# Patient Record
Sex: FEMALE | Race: WHITE | Employment: FULL TIME | URBAN - METROPOLITAN AREA
[De-identification: names, ages, dates, MRNs, and addresses within clinical notes are randomized per-mention and may not be internally consistent; named-entity substitution may affect disease eponyms.]

---

## 2017-12-02 ENCOUNTER — HOSPITAL ENCOUNTER (OUTPATIENT)
Dept: MAMMOGRAPHY | Age: 42
Discharge: HOME | End: 2017-12-02
Attending: INTERNAL MEDICINE
Payer: COMMERCIAL

## 2017-12-02 DIAGNOSIS — Z12.31 VISIT FOR SCREENING MAMMOGRAM: ICD-10-CM

## 2017-12-02 PROCEDURE — 77067 SCR MAMMO BI INCL CAD: CPT

## 2018-03-05 PROBLEM — R10.2 PELVIC PAIN IN FEMALE: Status: ACTIVE | Noted: 2018-03-05

## 2018-12-12 ENCOUNTER — HOSPITAL ENCOUNTER (OUTPATIENT)
Dept: MAMMOGRAPHY | Age: 43
Discharge: HOME | End: 2018-12-12
Attending: INTERNAL MEDICINE
Payer: COMMERCIAL

## 2018-12-12 DIAGNOSIS — Z12.31 VISIT FOR SCREENING MAMMOGRAM: ICD-10-CM

## 2018-12-12 PROCEDURE — 77067 SCR MAMMO BI INCL CAD: CPT

## 2020-03-04 ENCOUNTER — HOSPITAL ENCOUNTER (OUTPATIENT)
Dept: MAMMOGRAPHY | Age: 45
Discharge: HOME | End: 2020-03-04
Attending: OBSTETRICS & GYNECOLOGY
Payer: COMMERCIAL

## 2020-03-04 DIAGNOSIS — Z12.31 VISIT FOR SCREENING MAMMOGRAM: ICD-10-CM

## 2020-03-04 PROCEDURE — 77063 BREAST TOMOSYNTHESIS BI: CPT

## 2021-02-20 PROBLEM — E55.9 VITAMIN D DEFICIENCY: Status: ACTIVE | Noted: 2021-02-20

## 2021-05-04 ENCOUNTER — TRANSCRIBE ORDER (OUTPATIENT)
Dept: SCHEDULING | Age: 46
End: 2021-05-04

## 2021-05-04 DIAGNOSIS — Z12.31 SCREENING MAMMOGRAM FOR HIGH-RISK PATIENT: Primary | ICD-10-CM

## 2021-05-19 ENCOUNTER — HOSPITAL ENCOUNTER (OUTPATIENT)
Dept: MAMMOGRAPHY | Age: 46
Discharge: HOME | End: 2021-05-19
Attending: OBSTETRICS & GYNECOLOGY
Payer: COMMERCIAL

## 2021-05-19 DIAGNOSIS — Z12.31 SCREENING MAMMOGRAM FOR HIGH-RISK PATIENT: ICD-10-CM

## 2021-05-19 PROCEDURE — 77063 BREAST TOMOSYNTHESIS BI: CPT

## 2021-05-24 ENCOUNTER — HOSPITAL ENCOUNTER (OUTPATIENT)
Dept: MAMMOGRAPHY | Age: 46
Discharge: HOME | End: 2021-05-24
Attending: OBSTETRICS & GYNECOLOGY
Payer: COMMERCIAL

## 2021-05-24 DIAGNOSIS — R92.8 ABNORMAL SCREENING MAMMOGRAM: ICD-10-CM

## 2021-05-24 PROCEDURE — 77061 BREAST TOMOSYNTHESIS UNI: CPT

## 2021-05-25 NOTE — PROGRESS NOTES
Pt advised of benign results. Also advised to continue annual mammogram screenings. Pt voiced understanding.

## 2021-09-14 ENCOUNTER — HOSPITAL ENCOUNTER (OUTPATIENT)
Dept: PHYSICAL THERAPY | Age: 46
Discharge: HOME | End: 2021-09-14
Attending: NURSE PRACTITIONER
Payer: COMMERCIAL

## 2021-09-14 DIAGNOSIS — M25.512 CHRONIC PAIN OF BOTH SHOULDERS: ICD-10-CM

## 2021-09-14 DIAGNOSIS — G89.29 CHRONIC PAIN OF BOTH SHOULDERS: ICD-10-CM

## 2021-09-14 DIAGNOSIS — M19.91 PRIMARY OSTEOARTHRITIS, UNSPECIFIED SITE: ICD-10-CM

## 2021-09-14 DIAGNOSIS — M25.511 CHRONIC PAIN OF BOTH SHOULDERS: ICD-10-CM

## 2021-09-14 DIAGNOSIS — M62.838 MUSCLE SPASM: ICD-10-CM

## 2021-09-14 PROCEDURE — 97110 THERAPEUTIC EXERCISES: CPT

## 2021-09-14 PROCEDURE — 97140 MANUAL THERAPY 1/> REGIONS: CPT

## 2021-09-14 PROCEDURE — 97161 PT EVAL LOW COMPLEX 20 MIN: CPT

## 2021-09-14 NOTE — THERAPY EVALUATION
Jo Vasquez  : 1975  Primary: Paul Flores [facility]*  Secondary:  [address] at [place] [address] [place], [address] [place] [address], [place], [address] [place] [address]  [phone]   [phone]       OUTPATIENT PHYSICAL THERAPY:Initial Assessment 2021    ICD-10: Treatment Diagnosis: muscle spasms (G33.723)                Treatment Diagnosis 2: bilateral shoulder pain (M25.511, M25.512)                Treatment Diagnosis 3: cervicalgia (M54.2)                Treatment Diagnosis 4: primary osteoarthritis (M19.91)  Precautions: Type II Diabetes, history of migraines  Allergies: Patient has no known allergies. TREATMENT PLAN:  Effective Dates: 2021 TO 2021 (60 days). Frequency/Duration: 1 time a week for 60 Day(s) MEDICAL/REFERRING DIAGNOSIS:  Primary osteoarthritis, unspecified site [M19.91]  Muscle spasm [M62.838]  Chronic pain of both shoulders [M25.511, G89.29, M25.512]   DATE OF ONSET: Patient reports ongoing pain/ issues for about a year. REFERRING PHYSICIAN: Randee Owens NP MD Orders: Evaluate and Treat   Return MD Appointment: 2021     INITIAL ASSESSMENT:  Ms. Marbin Elmore is a 40 y.o. female presenting to physical therapy with complaints of muscle spasms/ tightness in her shoulders/ neck, R side worse than L, that causes headaches and impacts her daily life. Patient reports pain starting about a year ago and lingering. She reports tightness across bilateral shoulders/ upper trapezius that runs to the base of her skull. 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 She does have frequent headaches from muscle tension and reports stress making her pain worse. Patient is eager to improve her pain and reduce her headaches in order to return to her normal activities.  Patient presents with increased pain, decreased strength, decreased ROM, decreased flexibility, impaired posture, decreased activity tolerance, and overall impaired functional mobility. Patient is a good candidate for skilled physical therapy interventions to include manual therapy, therapeutic exercise, postural re-education, body mechanics training, and pain modalities as needed. PROBLEM LIST (Impacting functional limitations):  1. Decreased Strength  2. Decreased ADL/Functional Activities  3. Increased Pain  4. Decreased Activity Tolerance  5. Decreased Pacing Skills  6. Decreased Work Simplification/Energy Conservation Techniques  7. Decreased Flexibility/Joint Mobility INTERVENTIONS PLANNED: (Treatment may consist of any combination of the following)  1. Cold  2. Electrical Stimulation  3. Family Education  4. Heat  5. Home Exercise Program (HEP)  6. Manual Therapy  7. Neuromuscular Re-education/Strengthening  8. Range of Motion (ROM)  9. Therapeutic Activites  10. Therapeutic Exercise/Strengthening  11. Transfer Training  12. Ultrasound (US)  13. Therapeutic Dry Needling     GOALS: (Goals have been discussed and agreed upon with patient.)  Short-Term Functional Goals: Time Frame: 9/14/2021 to 10/13/2021  1. Patient demonstrates independence with home exercise program without verbal cueing provided by therapist.   2. Patient will be educated in and demonstrate improved upright posture including decreased forward head and rounded shoulders to decrease strain on the cervical spine. 3. Patient will be educated in and try the use of towel rolls, tennis balls, and desk adjustments to improve sleeping, muscle tension, and work ergonomics. 4. Patient will be able to sleep through the night without waking due to neck pain or headache in order to improve sleeping pattern for health and wellness. Discharge Goals: Time Frame: 9/14/2021 to 11/13/2021  1. Patient will demonstrate decreased trigger points and tightness through bilateral upper trapezius in order to improve ROM and decrease muscle tension.   2. Patient will report minimal to no headaches with work activities in order to demonstrate decreased strain and cervicogenic headaches. 3. Patient will be able to work a full day at her desk with minimal to no complaints of neck/ shoulder pain in order to demonstrate improved activity tolerance and ability to perform job duties. 4. Patient will report no more than 1 headache a week in order to demonstrate decreased frequency of tension headaches. 5. Patient will improve Disability of the Arm, Shoulder, and Hand score to 15/55 from 19/55. Outcome Measure: Tool Used: Disabilities of the Arm, Shoulder and Hand (DASH) Questionnaire - Quick Version  Score:  Initial: 19/55  Most Recent: X/55 (Date: -- )   Interpretation of Score: The DASH is designed to measure the activities of daily living in person's with upper extremity dysfunction or pain. Each section is scored on a 1-5 scale, 5 representing the greatest disability. The scores of each section are added together for a total score of 55. Medical Necessity:   · Patient is expected to demonstrate progress in strength, range of motion and posture to increase independence with work duties and decrease strain on the cervical spine. · Skilled intervention continues to be required due to muscle tightness and headaches impacting daily life and work duties. Reason for Services/Other Comments:  · Patient continues to require skilled intervention due to increased pain, muscle tightness, and headaches impacting job duties and quality of life. Total Evaluation Duration: 20 minutes    Rehabilitation Potential For Stated Goals: Good  Regarding [place] therapy, I certify that the treatment plan above will be carried out by a therapist or under their direction.   Thank you for this referral,  Maura Quinonez PT     Referring Physician Signature: Cy More NP _______________________________ Date _____________             PAIN/SUBJECTIVE:    Initial: Pain Intensity 1: 8 (at worst)  Pain Location 1: Neck, Shoulder  Pain Orientation 1: Right, Left (R worse than L)  Post Session:  0/10    HISTORY:    History of Injury/Illness (Reason for Referral):  Ms. Tran Lutz is a 40 y.o. female presenting to physical therapy with complaints of muscle spasms/ tightness in her shoulders/ neck, R side worse than L, that causes headaches and impacts her daily life. Patient reports pain starting about a year ago and lingering. She reports tightness across bilateral shoulders/ upper trapezius that runs to the base of her skull. 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 She does have frequent headaches from muscle tension and reports stress making her pain worse. Patient is eager to improve her pain and reduce her headaches in order to return to her normal activities. Patient presents with increased pain, decreased strength, decreased ROM, decreased flexibility, impaired posture, decreased activity tolerance, and overall impaired functional mobility. Past Medical History/Comorbidities:   Ms. Tran Lutz  has a past medical history of Cardiac radiofrequency ablation (2009), Depression, Dyslipidemia, Essential hypertension, GERD, H/O paroxysmal supraventricular tachycardia (2009), Migraine headache, and Type 2 diabetes mellitus ([facility] [address] Ms. Tran Lutz  has a past surgical history that includes pr cardiac surg procedure unlist (2009). Social History/Living Environment:     Patient lives in private residence with her  and two children.   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 Federal Law prohibits the replication, distribution or use without written permission from [facility] [employer]    Current Medications:        Current Outpatient Medications:     metFORMIN (GLUCOPHAGE) 500 mg tablet, Take 1 Tablet by mouth two (2) times daily (with meals). , Disp: 180 Tablet, Rfl: 0    Blood-Glucose Meter monitoring kit, Use to monitor blood sugar daily. Dx: E11. Insurance preference, Disp: 1 Kit, Rfl: 0    lancets misc, Use to monitor blood sugar daily. Dx: E11. Insurance preference, Disp: 100 Each, Rfl: 3    glucose blood VI test strips (ASCENSIA AUTODISC VI, ONE TOUCH ULTRA TEST VI) strip, Use to monitor blood sugar daily. Dx: E11. Insurance preference, Disp: 100 Strip, Rfl: 3    meloxicam (MOBIC) 15 mg tablet, Take 1 Tablet by mouth daily as needed for Pain., Disp: 90 Tablet, Rfl: 1    cyclobenzaprine (FLEXERIL) 10 mg tablet, Take 1 Tablet by mouth nightly as needed for Muscle Spasm(s). , Disp: 90 Tablet, Rfl: 1    hyoscyamine SL (LEVSIN/SL) 0.125 mg SL tablet, Take 1 Tablet by mouth every six (6) hours as needed for Cramping or Diarrhea., Disp: 90 Tablet, Rfl: 1    pantoprazole (PROTONIX) 40 mg tablet, Take 1 Tablet by mouth daily. , Disp: 90 Tablet, Rfl: 1    citalopram (CELEXA) 40 mg tablet, Take 1 Tablet by mouth daily. , Disp: 90 Tablet, Rfl: 1    atorvastatin (LIPITOR) 20 mg tablet, Take 1 Tablet by mouth nightly., Disp: 90 Tablet, Rfl: 1    metoprolol succinate (TOPROL-XL) 25 mg XL tablet, Take 2 Tablets by mouth daily. , Disp: 180 Tablet, Rfl: 1    L-Norgest&E Estradiol-E Estrad 0.10 mg-20 mcg (84)/10 mcg (7) 3MPk, TAKE 1 TABLET BY MOUTH EVERY DAY, Disp: 1 Package, Rfl: 10    ergocalciferol (Drisdol) 1,250 mcg (50,000 unit) capsule, Take 1 Cap by mouth every seven (7) days. , Disp: 12 Cap, Rfl: 1    Date Last Reviewed:  9/14/2021    Number of Personal Factors/Comorbidities that affect the Plan of Care:  Patient with minimal co-morbidities, but chronic pain 1-2: MODERATE COMPLEXITY    EXAMINATION:    Patient denies any changes in vision, dizziness, vertigo, nausea, drop attacks, black outs, tinnitus, dysphagia, dysarthria, LE symptoms or bowel/bladder dysfunction. Observation/Orthostatic Postural Assessment:          Patient with forward head, rounded shoulders, scapular protraction, increased thoracic kyphosis. Increased muscle tension bilateral upper trapezius. Palpation:          Moderate tenderness to palpation of bilateral upper trapezius and levator scapula with increased tension and trigger points noted. Minimal to no tenderness to palpation of gross bilateral glenohumeral joints and AC joints. Moderate tenderness to palpation of gross cervical spine with significant tightness bilateral suboccipitals, R worse than L.  Vertebral-Basilar Screen: Positional Provocation testing is negative. ROM:          Gross bilateral UE ROM WFL with no pain with active or passive motion. AROM/ PROM Degrees   Cervical Flexion 45   Cervical Extension 40   Right Rotation 47   Left Rotation 54   Right Lateral Flexion 40   Left Lateral Flexion 30     Strength:          Gross bilateral UE strength 5/5 with no pain reported with resisted testing. Gross deep cervical weakness noted with posturing and transfers    Special Tests: All glenohumeral joint testing negative           Cervical compression/ distraction negative for change in symptoms, but cervical traction \"feels good\"  Passive Accessory Motion:         Moderate tightness with cervical side glides C4 through C7        Moderate limitation with posterior to anterior central mobilizations of lower cervical and thoracic spine  Neurological Screen:              Myotomes: Key muscle strength testing through bilateral UE is [facility]. Dermatomes: Sensation to light touch for bilateral UE is intact from C4 to T2.    Reflexes: Biceps (C5): 3+ bilaterally Brachioradialis (C6): 3+ bilaterally        Triceps (C7): 3+ bilaterally  Abnormal reflexes: Mc: not tested  Functional Mobility:         Patient with increased muscle tension with working at her desk at work, reports multiple monthly headaches, waking a couple times a week due to neck pain- L side sleeper. Balance:          Sitting and standing balance grossly intact. Body Structures Involved:  1. Bones  2. Joints  3. Muscles  4. Ligaments Body Functions Affected:  1. Sensory/Pain  2. Neuromusculoskeletal  3. Movement Related Activities and Participation Affected:  1. Mobility  2. Self Care  3. Domestic Life  4.  Community, Social and [place]    Number of elements (examined above) that affect the Plan of Care: 1-2: LOW COMPLEXITY    CLINICAL PRESENTATION:    Presentation:   Stable and uncomplicated: LOW COMPLEXITY    CLINICAL DECISION MAKING:    Use of outcome tool(s) and clinical judgement create a POC that gives a: Clear prediction of patient's progress: LOW COMPLEXITY

## 2021-09-14 NOTE — PROGRESS NOTES
Stefan Show  : 1975  Primary: Paul Flores [facility] Emp Si*  Secondary:  [address] at [facility]  [address], [address] [place], [place], [address]  [phone]   [phone]      OUTPATIENT PHYSICAL THERAPY: Daily Treatment Note 2021    ICD-10: Treatment Diagnosis: muscle spasms (V19.835)                Treatment Diagnosis 2: bilateral shoulder pain (M25.511, M25.512)                Treatment Diagnosis 3: cervicalgia (M54.2)                Treatment Diagnosis 4: primary osteoarthritis (M19.91)  Precautions: Type II Diabetes, history of migraines  Allergies: Patient has no known allergies. TREATMENT PLAN:  Effective Dates: 2021 TO 2021 (60 days). Frequency/Duration: 1 time a week for 60 Day(s) MEDICAL/REFERRING DIAGNOSIS:  Primary osteoarthritis, unspecified site [M19.91]  Muscle spasm [M62.838]  Chronic pain of both shoulders [M25.511, G89.29, M25.512]   DATE OF ONSET: Patient reports ongoing pain/ issues for about a year. REFERRING PHYSICIAN: Bailee Torres NP MD Orders: Evaluate and Treat   Return MD Appointment: 2021     Pre-treatment Symptoms/Complaints:  Patient reports increased muscle tension and tightness through bilateral shoulders/ neck, R worse than L, which causes headaches and impacts her work duties. Pain: Initial: Pain Intensity 1: 8 (at worst)  Pain Location 1: Neck, Shoulder  Pain Orientation 1: Right, Left (R worse than L)  Post Session:  0/10   Medications Last Reviewed:  2021  Updated Objective Findings:  See evaluation note from today   TREATMENT:   THERAPEUTIC EXERCISE: (10 minutes):  Exercises per grid below to improve mobility, strength and coordination. Required moderate visual, verbal, manual and tactile cues to promote proper body alignment, promote proper body posture and promote proper body mechanics. Progressed resistance, range, repetitions and complexity of movement as indicated. Date:  9/14/2021 Date:   Date:     Activity/Exercise Parameters Parameters Parameters   Scapular retraction X 10     Backwards shoulder rolls X 10     Chin tucks X 5     Upper trapezius stretch X 2      Levator scapula stretch X 2                   Time spent with patient reviewing proper muscle recruitment and technique with exercises. MANUAL THERAPY: (15 minutes): Joint mobilization, Soft tissue mobilization and Manipulation was utilized and necessary because of the patient's restricted joint motion, painful spasm, loss of articular motion and restricted motion of soft tissue   Gentle suboccipital release to decrease tension/ headache   Gentle soft tissue mobilization to bilateral upper trapezius with trigger point release to decrease tightness   Prone posterior to anterior mobilizations of lower cervical and thoracic spine, grade III, to improve mobility   Seated cervicothoracic manipulation without audible cavitation to improve mobility    MODALITIES: (0 minutes):      none today     HEP: As above; handouts given to patient for all exercises. Treatment/Session Summary:    · Response to Treatment:  Patient with good performance of exercises and expressed understanding of HEP. Anticipate good progress with manual therapy and postural education/ exercises. May trial dry needling as needed. .  · Communication/Consultation:  Spoke at length with patient about plan of care, possible dry needling, manual techniques, use of towel roll and tennis balls for self positioning and pain relief. · Equipment provided today:  Patient given handout with above exercise for home  · Recommendations/Intent for next treatment session: Next visit will focus on manual therapy and postural exercises as tolerated, modalities as needed. Progress HEP as tolerated.     Total Treatment Billable Duration:  45 minutes: 20 evaluation, 15 manual, 10 therapeutic exercise  PT Patient Time In/Time Out  Time In: 0905  Time Out: [address] Atlas Goldberg Arabella Sprague, PT           '

## 2021-09-23 ENCOUNTER — HOSPITAL ENCOUNTER (OUTPATIENT)
Dept: PHYSICAL THERAPY | Age: 46
Discharge: HOME | End: 2021-09-23
Attending: NURSE PRACTITIONER
Payer: COMMERCIAL

## 2021-09-23 PROCEDURE — 97140 MANUAL THERAPY 1/> REGIONS: CPT

## 2021-09-23 PROCEDURE — 97110 THERAPEUTIC EXERCISES: CPT

## 2021-09-23 NOTE — PROGRESS NOTES
Licha Partida  : 1975  Primary: Paul Flores [facility] Emp Si*  Secondary:  [address] at [facility]  [address] [place], [address] [place] [address], [place], [address] [place] [address]  [phone]   [phone]      OUTPATIENT PHYSICAL THERAPY: Daily Treatment Note 2021    ICD-10: Treatment Diagnosis: muscle spasms (N43.771)                Treatment Diagnosis 2: bilateral shoulder pain (M25.511, M25.512)                Treatment Diagnosis 3: cervicalgia (M54.2)                Treatment Diagnosis 4: primary osteoarthritis (M19.91)  Precautions: Type II Diabetes, history of migraines  Allergies: Patient has no known allergies. TREATMENT PLAN:  Effective Dates: 2021 TO 2021 (60 days). Frequency/Duration: 1 time a week for 60 Day(s) MEDICAL/REFERRING DIAGNOSIS:  Primary osteoarthritis, unspecified site [M19.91]  Other muscle spasm [M62.838]  Pain in right shoulder [M25.511]  Pain in left shoulder [M25.512]   DATE OF ONSET: Patient reports ongoing pain/ issues for about a year. REFERRING PHYSICIAN: Arnoldo Jessica NP MD Orders: Evaluate and Treat   Return MD Appointment: 2021     Pre-treatment Symptoms/Complaints:  Patient reports some relief with home exercise program and that she has overall been feeling some better. She reports however today she is hurting worse, muscle spasm right side of her neck that is going up to her head and causing a headache. Pain: Initial: Pain Intensity 1: 4  Pain Location 1: Neck, Shoulder  Pain Orientation 1: Right  Post Session:  0/10   Medications Last Reviewed:  2021  Updated Objective Findings:  Palpation: Bilateral Upper Trapezius Tenderness, Tightness, Trigger Points. TREATMENT:   THERAPEUTIC EXERCISE: (24 minutes):  Exercises per grid below to improve mobility, strength and coordination.   Required moderate visual, verbal, manual and tactile cues to promote proper body alignment, promote proper body posture and promote proper body mechanics. Progressed resistance, range, repetitions and complexity of movement as indicated. Date:  9/14/2021 Date:  9/23/2021 Date:     Activity/Exercise Parameters Parameters Parameters   Scapular retraction X 10     Backwards shoulder rolls X 10     Chin tucks X 5     Upper trapezius stretch X 2      Levator scapula stretch X 2     UBE  6 minutes, 3 minutes FWD, 3 minutes BWD    Trunk Rotation  Supine x 30     TRX  Flexion/Extension 3 x 10    Abduction 3 x 10            Time spent with patient reviewing proper muscle recruitment and technique with exercises. MANUAL THERAPY: (30 minutes): Joint mobilization, Soft tissue mobilization and Manipulation was utilized and necessary because of the patient's restricted joint motion, painful spasm, loss of articular motion and restricted motion of soft tissue   Soft Tissue Mobilization Bilateral Upper Trapezius, Levator Scapula, Scalenes   Trigger Point Dry Needling Bilateral Upper Trapezius   Dry Needles Used: 7   Dry Needles Removed: 7       MODALITIES: (0 minutes):      none today     HEP: As above; handouts given to patient for all exercises. Treatment/Session Summary:    · Response to Treatment:  Patient was instructed regarding trigger point dry needling, including precautions, contraindications, post care. Patient denied any contraindications to trigger point dry needling. Patient gave verbal and written consent. Patient demonstrated appropriate twitch response, no negative response or soreness. Patient reported significant symptom relief, no pain post treatment. She would benefit from continued progression of mobility, flexibility, ROM to progress functionally. · Communication/Consultation:  Home Exercise Program, Trigger Point Dry Needling Handouts  · Equipment provided today:  None Today  · Recommendations/Intent for next treatment session: Next visit will focus on manual therapy and postural exercises as tolerated, modalities as needed.  Progress HEP as tolerated. Review response to trigger point needling.      Total Treatment Billable Duration:  54 minutes  PT Patient Time In/Time Out  Time In: 1324  Time Out: GIOVANNY Chan       '

## 2021-09-29 ENCOUNTER — HOSPITAL ENCOUNTER (OUTPATIENT)
Dept: PHYSICAL THERAPY | Age: 46
Discharge: HOME | End: 2021-09-29
Attending: NURSE PRACTITIONER
Payer: COMMERCIAL

## 2021-09-29 PROCEDURE — 97110 THERAPEUTIC EXERCISES: CPT

## 2021-09-29 PROCEDURE — 97140 MANUAL THERAPY 1/> REGIONS: CPT

## 2021-09-29 NOTE — PROGRESS NOTES
Nayeli Hooker  : 1975  Primary: Paul Flores [facility]*  Secondary:  [address] at [facility]  [address] [place], [address] [place] [address], [place], [address] [place] [address]  [phone]   [phone]      OUTPATIENT PHYSICAL THERAPY: Daily Treatment Note 2021    ICD-10: Treatment Diagnosis: muscle spasms (P19.307)                Treatment Diagnosis 2: bilateral shoulder pain (M25.511, M25.512)                Treatment Diagnosis 3: cervicalgia (M54.2)                Treatment Diagnosis 4: primary osteoarthritis (M19.91)  Precautions: Type II Diabetes, history of migraines  Allergies: Patient has no known allergies. TREATMENT PLAN:  Effective Dates: 2021 TO 2021 (60 days). Frequency/Duration: 1 time a week for 60 Day(s) MEDICAL/REFERRING DIAGNOSIS:  Primary osteoarthritis, unspecified site [M19.91]  Other muscle spasm [M62.838]  Pain in right shoulder [M25.511]  Pain in left shoulder [M25.512]   DATE OF ONSET: Patient reports ongoing pain/ issues for about a year. REFERRING PHYSICIAN: Le Berg NP MD Orders: Evaluate and Treat   Return MD Appointment: 2021     Pre-treatment Symptoms/Complaints:  Patient reports that she had trace soreness after last session, but has felt really good since. She reports no pain today. Pain: Initial: Pain Intensity 1: 0  Pain Location 1: Neck, Shoulder  Pain Orientation 1: Right, Left  Post Session:  0/10   Medications Last Reviewed:  2021  Updated Objective Findings:  Palpation: Bilateral Upper Trapezius Tenderness, Tightness, Trigger Points. TREATMENT:   THERAPEUTIC EXERCISE: (24 minutes):  Exercises per grid below to improve mobility, strength and coordination. Required moderate visual, verbal, manual and tactile cues to promote proper body alignment, promote proper body posture and promote proper body mechanics. Progressed resistance, range, repetitions and complexity of movement as indicated.      Date:  2021 Date:  9/23/2021 Date:  9/29/2021   Activity/Exercise Parameters Parameters Parameters   Scapular retraction X 10     Backwards shoulder rolls X 10     Chin tucks X 5     Upper trapezius stretch X 2      Levator scapula stretch X 2     UBE  6 minutes, 3 minutes FWD, 3 minutes BWD 8 minutes, 4 minutes FWD, 4 minutes BWD   Trunk Rotation  Supine x 30  Supine x 30    TRX  Flexion/Extension 3 x 10    Abduction 3 x 10 Flexion/Extension 3 x 15    Abduction 3 x 10   Low Rows   3 x 10 Red   Rows   3 x 10 Red     Time spent with patient reviewing proper muscle recruitment and technique with exercises. MANUAL THERAPY: (30 minutes): Joint mobilization, Soft tissue mobilization and Manipulation was utilized and necessary because of the patient's restricted joint motion, painful spasm, loss of articular motion and restricted motion of soft tissue   Soft Tissue Mobilization Bilateral Upper Trapezius, Levator Scapula, Scalenes   Suboccipital Release   Gentle Manual Traction   Trigger Point Dry Needling Bilateral Upper Trapezius   Dry Needles Used: 8   Dry Needles Removed: 8       MODALITIES: (0 minutes):      none today     HEP: As above; handouts given to patient for all exercises. Treatment/Session Summary:    · Response to Treatment:  Patient was instructed regarding trigger point dry needling, including precautions, contraindications, post care. Patient denied any contraindications to trigger point dry needling. Patient gave verbal and written consent. Patient demonstrated appropriate twitch response, no negative response or soreness. Patient reported significant symptom relief, no pain post treatment. She would benefit from graded progression into further postural strengthening.   · Communication/Consultation:  Home Exercise Program, Trigger Point Dry Needling Handouts  · Equipment provided today:  None Today  · Recommendations/Intent for next treatment session: Next visit will focus on manual therapy and postural exercises as tolerated, modalities as needed. Progress HEP as tolerated. Continue to review response to trigger point needling.      Total Treatment Billable Duration:  54 minutes  PT Patient Time In/Time Out  Time In: 0971  Time Out: 101 Ed Oakley, PT       '

## 2021-10-08 ENCOUNTER — HOSPITAL ENCOUNTER (OUTPATIENT)
Dept: PHYSICAL THERAPY | Age: 46
Discharge: HOME | End: 2021-10-08
Attending: NURSE PRACTITIONER
Payer: COMMERCIAL

## 2021-10-08 NOTE — PROGRESS NOTES
Physical Therapy  Ana M Puentes at [facility] 10/8/2021    Patient not able to make it today, confirmed next appointment.       Tommie Merritt PT, DPT, TPS

## 2021-10-11 ENCOUNTER — HOSPITAL ENCOUNTER (OUTPATIENT)
Dept: PHYSICAL THERAPY | Age: 46
Discharge: HOME | End: 2021-10-11
Attending: NURSE PRACTITIONER
Payer: COMMERCIAL

## 2021-10-11 PROCEDURE — 97140 MANUAL THERAPY 1/> REGIONS: CPT

## 2021-10-11 PROCEDURE — 97110 THERAPEUTIC EXERCISES: CPT

## 2021-10-11 NOTE — PROGRESS NOTES
Joel Stewart  : 1975  Primary: Paul Flores [facility]*  Secondary:  [address] at [facility]  [address] [place], [address], [place], [address]  [phone]   [phone]      OUTPATIENT PHYSICAL THERAPY: Daily Treatment Note 10/11/2021    ICD-10: Treatment Diagnosis: muscle spasms (M76.880)                Treatment Diagnosis 2: bilateral shoulder pain (M25.511, M25.512)                Treatment Diagnosis 3: cervicalgia (M54.2)                Treatment Diagnosis 4: primary osteoarthritis (M19.91)  Precautions: Type II Diabetes, history of migraines  Allergies: Patient has no known allergies. TREATMENT PLAN:  Effective Dates: 2021 TO 2021 (60 days). Frequency/Duration: 1 time a week for 60 Day(s) MEDICAL/REFERRING DIAGNOSIS:  Primary osteoarthritis, unspecified site [M19.91]  Other muscle spasm [M62.838]  Pain in right shoulder [M25.511]  Pain in left shoulder [M25.512]   DATE OF ONSET: Patient reports ongoing pain/ issues for about a year. REFERRING PHYSICIAN: Jennie Joe NP MD Orders: Evaluate and Treat   Return MD Appointment: 2021     Pre-treatment Symptoms/Complaints:  Patient reports feeling better since starting therapy and having just a little tightness through her R upper trapezius today. Reports less headaches    Pain: Initial: Pain Intensity 1: 1  Pain Location 1: Neck, Shoulder  Pain Orientation 1: Right  Post Session:  0/10   Medications Last Reviewed:  10/11/2021  Updated Objective Findings:  Palpation: Bilateral Upper Trapezius Tenderness, Tightness, Trigger Points; R worse than L   TREATMENT:   THERAPEUTIC EXERCISE: (24 minutes):  Exercises per grid below to improve mobility, strength and coordination. Required moderate visual, verbal, manual and tactile cues to promote proper body alignment, promote proper body posture and promote proper body mechanics.   Progressed resistance, range, repetitions and complexity of movement as indicated. Date:  10/11/2021 Date:  9/23/2021 Date:  9/29/2021   Activity/Exercise Parameters Parameters Parameters   Scapular retraction ---     Backwards shoulder rolls ---     Chin tucks ---     Upper trapezius stretch ---     Levator scapula stretch ---     UBE Level 2, 3 minutes forward, 3 minutes backwards 6 minutes, 3 minutes FWD, 3 minutes BWD 8 minutes, 4 minutes FWD, 4 minutes BWD   Trunk Rotation Supine x 20 Supine x 30  Supine x 30    Thoracic bows X 10 each side     TRX Flexion/Extension 3 x 10    Abduction 3 x 10 Flexion/Extension 3 x 10    Abduction 3 x 10 Flexion/Extension 3 x 15    Abduction 3 x 10   Low Rows Red, 3 x 10  3 x 10 Red   Rows Red, 3 x 10  3 x 10 Red     Time spent with patient reviewing proper muscle recruitment and technique with exercises. MANUAL THERAPY: (30 minutes): Joint mobilization, Soft tissue mobilization and Manipulation was utilized and necessary because of the patient's restricted joint motion, painful spasm, loss of articular motion and restricted motion of soft tissue   Soft Tissue Mobilization Bilateral Upper Trapezius, Levator Scapula, Scalenes   Suboccipital Release   Gentle Manual Traction   Manual upper trapezius and levator scapula stretching   Trigger Point Dry Needling Bilateral Upper Trapezius - NOT TODAY  Dry Needles Used: 8   Dry Needles Removed: 8       MODALITIES: (0 minutes):      none today     HEP: As above; handouts given to patient for all exercises. Treatment/Session Summary:    · Response to Treatment:  Patient with decreased pain and tightness at end of session. Advised patient to continue with stretching at home. · Communication/Consultation:  Home Exercise Program, Trigger Point Dry Needling Handouts  · Equipment provided today:  None Today  · Recommendations/Intent for next treatment session: Next visit will focus on manual therapy and postural exercises as tolerated, modalities as needed. Progress HEP as tolerated. Continue to review response to trigger point needling.      Total Treatment Billable Duration:  54 minutes  PT Patient Time In/Time Out  Time In: 0905  Time Out: 1000 Aurora Donohue, GIOVANNY       '

## 2021-10-22 ENCOUNTER — HOSPITAL ENCOUNTER (OUTPATIENT)
Dept: PHYSICAL THERAPY | Age: 46
Discharge: HOME | End: 2021-10-22
Attending: NURSE PRACTITIONER
Payer: COMMERCIAL

## 2021-10-22 NOTE — PROGRESS NOTES
[address] at [facility] 10/22/2021      Patient cancelled today's appointment due to having a flat tire. Stated she would call back to re-schedule.        Beto Delarosa, PT, DPT, OMT-C

## 2022-02-11 PROBLEM — R10.2 PELVIC PAIN IN FEMALE: Status: RESOLVED | Noted: 2018-03-05 | Resolved: 2022-02-11

## 2022-03-18 PROBLEM — E55.9 VITAMIN D DEFICIENCY: Status: ACTIVE | Noted: 2021-02-20

## 2022-04-05 NOTE — THERAPY DISCHARGE
Kale Jj  : 1975  Primary: Paul Flores [facility]  Secondary:  [address] at [place] [id number] [facility], [address] [place] [address], [place], [address] [place] [address]  [phone]   [phone]       OUTPATIENT PHYSICAL THERAPY:Discontinuation Summary 2022    ICD-10: Treatment Diagnosis: muscle spasms (Q67.498)                Treatment Diagnosis 2: bilateral shoulder pain (M25.511, M25.512)                Treatment Diagnosis 3: cervicalgia (M54.2)                Treatment Diagnosis 4: primary osteoarthritis (M19.91)  Precautions: Type II Diabetes, history of migraines  Allergies: Patient has no known allergies. TREATMENT PLAN:  Effective Dates: 2021 TO 2021 (60 days). Frequency/Duration: 1 time a week for 60 Day(s) MEDICAL/REFERRING DIAGNOSIS:  Primary osteoarthritis, unspecified site [M19.91]  Other muscle spasm [M62.838]  Pain in right shoulder [M25.511]  Pain in left shoulder [M25.512]   DATE OF ONSET: Patient reports ongoing pain/ issues for about a year. REFERRING PHYSICIAN: Eileen Figueroa NP MD Orders: Evaluate and Treat   Return MD Appointment: 2021     INITIAL ASSESSMENT:  Ms. Clementina Callahan is a 40 y.o. female presenting to physical therapy with complaints of muscle spasms/ tightness in her shoulders/ neck, R side worse than L, that causes headaches and impacts her daily life. Patient reports pain starting about a year ago and lingering. She reports tightness across bilateral shoulders/ upper trapezius that runs to the base of her skull. 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 She does have frequent headaches from muscle tension and reports stress making her pain worse. Patient is eager to improve her pain and reduce her headaches in order to return to her normal activities.  Patient presents with increased pain, decreased strength, decreased ROM, decreased flexibility, impaired posture, decreased activity tolerance, and overall impaired functional mobility. Patient is a good candidate for skilled physical therapy interventions to include manual therapy, therapeutic exercise, postural re-education, body mechanics training, and pain modalities as needed. DISCONTINUATION 4/5/2022: Patient was seen for 4 sessions of physical therapy from 9/14/2021 to 10/11/2021. She reported feeling better with less tightness and headaches at last session. Patient missed a few sessions and then had a flat tire and was to call to re-schedule. She never called back to get on the schedule and is discharged at this time. No goals were met and no updated objective measures taken due to patient not returning to therapy. PROBLEM LIST (Impacting functional limitations):  1. Decreased Strength  2. Decreased ADL/Functional Activities  3. Increased Pain  4. Decreased Activity Tolerance  5. Decreased Pacing Skills  6. Decreased Work Simplification/Energy Conservation Techniques  7. Decreased Flexibility/Joint Mobility INTERVENTIONS PLANNED: (Treatment may consist of any combination of the following)  1. Cold  2. Electrical Stimulation  3. Family Education  4. Heat  5. Home Exercise Program (HEP)  6. Manual Therapy  7. Neuromuscular Re-education/Strengthening  8. Range of Motion (ROM)  9. Therapeutic Activites  10. Therapeutic Exercise/Strengthening  11. Transfer Training  12. Ultrasound (US)  13. Therapeutic Dry Needling     GOALS: (Goals have been discussed and agreed upon with patient.)  Short-Term Functional Goals: Time Frame: 9/14/2021 to 10/13/2021  1. Patient demonstrates independence with home exercise program without verbal cueing provided by therapist. -NOT MET  2. Patient will be educated in and demonstrate improved upright posture including decreased forward head and rounded shoulders to decrease strain on the cervical spine. -NOT MET  3.  Patient will be educated in and try the use of towel rolls, tennis balls, and desk adjustments to improve sleeping, muscle tension, and work ergonomics. -NOT MET  4. Patient will be able to sleep through the night without waking due to neck pain or headache in order to improve sleeping pattern for health and wellness. -NOT MET  Discharge Goals: Time Frame: 9/14/2021 to 11/13/2021  1. Patient will demonstrate decreased trigger points and tightness through bilateral upper trapezius in order to improve ROM and decrease muscle tension. -NOT MET  2. Patient will report minimal to no headaches with work activities in order to demonstrate decreased strain and cervicogenic headaches. -NOT MET  3. Patient will be able to work a full day at her desk with minimal to no complaints of neck/ shoulder pain in order to demonstrate improved activity tolerance and ability to perform job duties. -NOT MET  4. Patient will report no more than 1 headache a week in order to demonstrate decreased frequency of tension headaches. -NOT MET  5. Patient will improve Disability of the Arm, Shoulder, and Hand score to 15/55 from 19/55. -NOT MET    Outcome Measure: Tool Used: Disabilities of the Arm, Shoulder and Hand (DASH) Questionnaire - Quick Version  Score:  Initial: 19/55  Most Recent: X/55 (Date: -- )   Interpretation of Score: The DASH is designed to measure the activities of daily living in person's with upper extremity dysfunction or pain. Each section is scored on a 1-5 scale, 5 representing the greatest disability. The scores of each section are added together for a total score of 55. No updated objective measures due to patient not returning to therapy. Reason for Services/Other Comments:  · Patient discharged . Rehabilitation Potential For Stated Goals: Good  Regarding Jeannette Haylees therapy, I certify that the treatment plan above will be carried out by a therapist or under their direction.   Thank you for this referral,  Graciela Mcleod, PT     Referring Physician Signature: Izabel Bass, CHINTAN No Signature is Required for this note.

## 2022-05-25 PROBLEM — Z91.199 NO-SHOW FOR APPOINTMENT: Status: ACTIVE | Noted: 2022-05-25

## 2022-06-09 ENCOUNTER — OFFICE VISIT (OUTPATIENT)
Dept: INTERNAL MEDICINE CLINIC | Facility: CLINIC | Age: 47
End: 2022-06-09
Payer: COMMERCIAL

## 2022-06-09 VITALS
SYSTOLIC BLOOD PRESSURE: 138 MMHG | HEIGHT: 63 IN | TEMPERATURE: 98.8 F | WEIGHT: 161 LBS | DIASTOLIC BLOOD PRESSURE: 76 MMHG | HEART RATE: 93 BPM | BODY MASS INDEX: 28.53 KG/M2 | OXYGEN SATURATION: 97 %

## 2022-06-09 DIAGNOSIS — K58.9 IRRITABLE BOWEL SYNDROME, UNSPECIFIED TYPE: ICD-10-CM

## 2022-06-09 DIAGNOSIS — E11.9 TYPE 2 DIABETES MELLITUS WITHOUT COMPLICATION, WITHOUT LONG-TERM CURRENT USE OF INSULIN (HCC): Primary | ICD-10-CM

## 2022-06-09 DIAGNOSIS — K21.9 GASTROESOPHAGEAL REFLUX DISEASE, UNSPECIFIED WHETHER ESOPHAGITIS PRESENT: ICD-10-CM

## 2022-06-09 DIAGNOSIS — G43.909 MIGRAINE SYNDROME: ICD-10-CM

## 2022-06-09 DIAGNOSIS — E55.9 VITAMIN D DEFICIENCY: ICD-10-CM

## 2022-06-09 DIAGNOSIS — F32.A ANXIETY AND DEPRESSION: ICD-10-CM

## 2022-06-09 DIAGNOSIS — E78.5 DYSLIPIDEMIA: ICD-10-CM

## 2022-06-09 DIAGNOSIS — F41.9 ANXIETY AND DEPRESSION: ICD-10-CM

## 2022-06-09 DIAGNOSIS — I10 ESSENTIAL HYPERTENSION: ICD-10-CM

## 2022-06-09 PROBLEM — Z91.199 NO-SHOW FOR APPOINTMENT: Status: RESOLVED | Noted: 2022-05-25 | Resolved: 2022-06-09

## 2022-06-09 PROCEDURE — 99214 OFFICE O/P EST MOD 30 MIN: CPT | Performed by: NURSE PRACTITIONER

## 2022-06-09 PROCEDURE — 3051F HG A1C>EQUAL 7.0%<8.0%: CPT | Performed by: NURSE PRACTITIONER

## 2022-06-09 RX ORDER — LISINOPRIL 30 MG/1
30 TABLET ORAL DAILY
Qty: 90 TABLET | Refills: 1 | Status: SHIPPED | OUTPATIENT
Start: 2022-06-09 | End: 2022-08-04 | Stop reason: SINTOL

## 2022-06-09 SDOH — ECONOMIC STABILITY: FOOD INSECURITY: WITHIN THE PAST 12 MONTHS, THE FOOD YOU BOUGHT JUST DIDN'T LAST AND YOU DIDN'T HAVE MONEY TO GET MORE.: NEVER TRUE

## 2022-06-09 SDOH — ECONOMIC STABILITY: FOOD INSECURITY: WITHIN THE PAST 12 MONTHS, YOU WORRIED THAT YOUR FOOD WOULD RUN OUT BEFORE YOU GOT MONEY TO BUY MORE.: NEVER TRUE

## 2022-06-09 ASSESSMENT — PATIENT HEALTH QUESTIONNAIRE - PHQ9
SUM OF ALL RESPONSES TO PHQ QUESTIONS 1-9: 0
7. TROUBLE CONCENTRATING ON THINGS, SUCH AS READING THE NEWSPAPER OR WATCHING TELEVISION: 0
9. THOUGHTS THAT YOU WOULD BE BETTER OFF DEAD, OR OF HURTING YOURSELF: 0
1. LITTLE INTEREST OR PLEASURE IN DOING THINGS: 0
6. FEELING BAD ABOUT YOURSELF - OR THAT YOU ARE A FAILURE OR HAVE LET YOURSELF OR YOUR FAMILY DOWN: 0
SUM OF ALL RESPONSES TO PHQ QUESTIONS 1-9: 0
2. FEELING DOWN, DEPRESSED OR HOPELESS: 0
SUM OF ALL RESPONSES TO PHQ QUESTIONS 1-9: 0
SUM OF ALL RESPONSES TO PHQ QUESTIONS 1-9: 0
4. FEELING TIRED OR HAVING LITTLE ENERGY: 0
5. POOR APPETITE OR OVEREATING: 0
3. TROUBLE FALLING OR STAYING ASLEEP: 0
8. MOVING OR SPEAKING SO SLOWLY THAT OTHER PEOPLE COULD HAVE NOTICED. OR THE OPPOSITE, BEING SO FIGETY OR RESTLESS THAT YOU HAVE BEEN MOVING AROUND A LOT MORE THAN USUAL: 0
SUM OF ALL RESPONSES TO PHQ9 QUESTIONS 1 & 2: 0

## 2022-06-09 ASSESSMENT — ENCOUNTER SYMPTOMS
COUGH: 0
VOMITING: 0
PHOTOPHOBIA: 0
NAUSEA: 0
DIARRHEA: 0
SHORTNESS OF BREATH: 0
ABDOMINAL PAIN: 0
WHEEZING: 0

## 2022-06-09 ASSESSMENT — SOCIAL DETERMINANTS OF HEALTH (SDOH): HOW HARD IS IT FOR YOU TO PAY FOR THE VERY BASICS LIKE FOOD, HOUSING, MEDICAL CARE, AND HEATING?: NOT HARD AT ALL

## 2022-06-09 NOTE — PROGRESS NOTES
[facility]  Office Visit Note    Subjective:  Chief Complaint   Patient presents with    Diabetes    Hypertension    Follow-up       Patient ID: Wisam Lopez is a 56 y.o. female presenting to the office for the above. 20-year-old female for follow up. Was a patient of Dr. Beth Elias. She is an RN at [employer]. [employer] ; two children (23yo son and 13yo daughter in 2021).    Diabetes--  Diagnosed 2020. Last A1c 7.0% February 2022 (7.2% August 2021, 7.1% February 2021). Taking metformin 750mg BID; tolerating well (1,000mg BID caused abdominal pain). *Urine microalbumin level: February 2022    *Diabetic eye exam: scheduled at [facility]   *Diabetic foot exam: 9/22/2020   Home sugars running <120 fasting. --Advise healthy diabetic diet, regular exercise.      Hypertension / Tachycardia / history of SVT--  Treated with metoprolol 50mg daily and lisinopril 30mg daily. Tolerating well without report of side effects. She had ablation in 2009. Was restarted on metoprolol for increasing tachycardia; states she feels much better on the metoprolol. Denies chest pain, palpitations, shortness of breath, peripheral edema, dizziness. --Advise low sodium diet, regular exercise, weight loss. Notify office if home BP consistently >130/90.      Hyperlipidemia--  Treated with atorvastatin 20mg daily. Tolerating well without report of side effects, including myalgias. Last lipid panel August 2021, with LDL 73, ALT 39, AST 33. --Encouraged to follow a healthy low-fat diet, avoiding saturated/trans fats/fried and fatty foods, get regular exercise, and weight loss.      Migraines--   She started Trokendi (topiramate ER) 25mg daily in March 2022; tolerating well without report of side effects. Uses Imitrex prn, with good relief. Migraines much improved since starting Trokendi; only having to use Imitrex about once a month. No auras.    Was given trial of baclofen for right occipital pain, possible occipital neuralgia; this is also working well for her. --Stable     Anxiety / Depression--   Patient currently treated with citalopram 40mg daily; tolerating well without report of side effects. Mood is stable. States the medication is working well for her. Denies thoughts of hurting herself.      GERD--  Stable on pantoprazole 40mg daily. No red flag symptoms.      IBS--  Stable with Levsin prn for diarrhea. Takes a few times a month.    Arhthritis/chronic pain--  Takes meloxicam prn. Completed PT.      Vitamin D deficiency--  Level was 11.9 in February 2021. Was prescribed Drisdol; completed 12 weeks therapy. Now taking OTC vitamin D(3) 1000 units daily. Level was 56.8 in February 2022.      Follows with Dr. Alexys Diaz for gynecological care. On Lo-Seasonique. Had some breakthrough bleeding August 2021, transvaginal ultrasound was unremarkable.      Health Maintenance:  *Colorectal cancer screening: Denies family history of colorectal cancer, changes to bowel habits, blood in stool. Cologuard negative September 2021.    *Mammogram: May 2021, no evidence of malignancy   *Pap: February 2020   *TDAP: had within past 10 years (around 2017) per patient work   *Flu: received at work per patient report   *Noreen sOorio: completed 8/4/21       History:  No Known Allergies    Past Medical History:   Diagnosis Date    Depression     Dyslipidemia     Essential hypertension     GERD (gastroesophageal reflux disease)     H/O cardiac radiofrequency ablation 2009    H/O paroxysmal supraventricular tachycardia 2009    Migraine headache     Type 2 diabetes mellitus ([facility] [address]        Past Surgical History:   Procedure Laterality Date    MA CARDIAC SURG PROCEDURE UNLIST  2009    Ablation-SVT       Family History   Problem Relation Age of Onset    Cancer Father         Renal    Hypertension Father     Heart Disease Father     Diabetes Father     Breast Cancer Neg Hx     Thyroid Disease Mother        Social History     Tobacco Use   Smoking Status Former Smoker    Packs/day: 0.50    Years: 15.00    Pack years: 7.50   Smokeless Tobacco Never Used       Social History     Substance and Sexual Activity   Alcohol Use Not Currently       Current Outpatient Medications   Medication Sig Dispense Refill    topiramate ER (TROKENDI XR) 25 MG CP24 Take 25 mg by mouth daily 90 capsule 1    lisinopril (PRINIVIL;ZESTRIL) 30 MG tablet Take 1 tablet by mouth daily 90 tablet 1    Lancets MISC Use to monitor blood sugar daily. Dx: E11. Insurance preference      atorvastatin (LIPITOR) 20 MG tablet Take 20 mg by mouth      baclofen (LIORESAL) 10 MG tablet Take 10 mg by mouth 3 times daily as needed      citalopram (CELEXA) 40 MG tablet Take 40 mg by mouth daily      cyclobenzaprine (FLEXERIL) 10 MG tablet Take 10 mg by mouth      Hyoscyamine Sulfate SL 0.125 MG SUBL Take 0.125 mg by mouth every 6 hours as needed      Levonorgest-Eth Estrad 91-Day 0.1-0.02 & 0.01 MG TABS TAKE 1 TABLET BY MOUTH EVERY DAY      meloxicam (MOBIC) 15 MG tablet Take 15 mg by mouth daily as needed      metFORMIN (GLUCOPHAGE) 500 MG tablet Take 750 mg by mouth 2 times daily (with meals)      metoprolol succinate (TOPROL XL) 25 MG extended release tablet Take 50 mg by mouth daily      pantoprazole (PROTONIX) 40 MG tablet Take 40 mg by mouth daily      SUMAtriptan (IMITREX) 100 MG tablet Take 100 mg by mouth as needed       No current facility-administered medications for this visit. Review of Systems:  Review of Systems   Constitutional: Negative for fever and unexpected weight change. Eyes: Negative for photophobia and visual disturbance. Respiratory: Negative for cough, shortness of breath and wheezing. Cardiovascular: Negative for chest pain, palpitations and leg swelling. Gastrointestinal: Negative for abdominal pain, diarrhea, nausea and vomiting. Skin: Negative for pallor.    Neurological: Positive for headaches (much improved). Negative for dizziness, seizures and weakness. Psychiatric/Behavioral: Negative for dysphoric mood. The patient is not nervous/anxious. See HPI for other pertinent positives and negatives. Objective:  Vitals:    06/09/22 1352   BP: 138/76   Site: Left Upper Arm   Position: Sitting   Cuff Size: Large Adult   Pulse: 93   Temp: 98.8 °F (37.1 °C)   TempSrc: Temporal   SpO2: 97%   Weight: 161 lb (73 kg)   Height: 5' 3\" (1.6 m)       Body mass index is 28.52 kg/m². Physical Exam  Vitals and nursing note reviewed. Constitutional:       General: She is not in acute distress. Appearance: Normal appearance. She is not ill-appearing or diaphoretic. HENT:      Head: Normocephalic and atraumatic. Eyes:      General: No scleral icterus. Right eye: No discharge. Left eye: No discharge. Cardiovascular:      Rate and Rhythm: Normal rate and regular rhythm. Heart sounds: Normal heart sounds. Pulmonary:      Effort: Pulmonary effort is normal. No respiratory distress. Breath sounds: Normal breath sounds. No wheezing, rhonchi or rales. Musculoskeletal:      Right lower leg: No edema. Left lower leg: No edema. Skin:     General: Skin is warm and dry. Neurological:      Mental Status: She is alert and oriented to person, place, and time.    Psychiatric:         Mood and Affect: Mood normal.         Speech: Speech normal.         Behavior: Behavior normal.                  Lab Results   Component Value Date    WBC 7.6 02/11/2022    HGB 14.7 02/11/2022    HCT 43.6 02/11/2022     (H) 02/11/2022     02/11/2022   ,   Lab Results   Component Value Date     02/11/2022    K 4.6 02/11/2022     02/11/2022    CO2 22 02/11/2022    BUN 9 02/11/2022    CREATININE 0.75 02/11/2022    GLUCOSE 109 02/11/2022    CALCIUM 9.8 02/11/2022    ,   Lab Results   Component Value Date    TSH 1.300 08/25/2021     Lab Results   Component Value Date    ALT 39 (H) 08/25/2021    AST 33 08/25/2021    ALKPHOS 69 08/25/2021    BILITOT 0.4 08/25/2021   ,   Lab Results   Component Value Date    LABA1C 7.0 (H) 02/11/2022     Lab Results   Component Value Date     02/11/2022       PHQ-9  6/9/2022   Little interest or pleasure in doing things 0   Little interest or pleasure in doing things -   Feeling down, depressed, or hopeless 0   Trouble falling or staying asleep, or sleeping too much 0   Feeling tired or having little energy 0   Poor appetite or overeating 0   Feeling bad about yourself - or that you are a failure or have let yourself or your family down 0   Trouble concentrating on things, such as reading the newspaper or watching television 0   Moving or speaking so slowly that other people could have noticed. Or the opposite - being so fidgety or restless that you have been moving around a lot more than usual 0   Thoughts that you would be better off dead, or of hurting yourself in some way 0   PHQ-2 Score 0   Total Score PHQ 2 -   PHQ-9 Total Score 0        Assessment/Plan:      Health Maintenance Due   Topic Date Due    Pneumococcal 0-64 years Vaccine (1 - PCV) Never done    Diabetic foot exam  Never done    HIV screen  Never done    Diabetic retinal exam  Never done    Hepatitis C screen  Never done    Hepatitis B vaccine (1 of 3 - Risk 3-dose series) Never done    DTaP/Tdap/Td vaccine (1 - Tdap) Never done    Cervical cancer screen  Never done    COVID-19 Vaccine (2 - Booster for Yovigo series) 09/29/2021          Hemanth Velarde was seen today for diabetes, hypertension and follow-up. Diagnoses and all orders for this visit:    Type 2 diabetes mellitus without complication, without long-term current use of insulin ([facility] [address]  -     Basic Metabolic Panel; Future  -     Hepatic Function Panel; Future  -     Lipid Panel; Future  -     Hemoglobin A1C; Future  -     lisinopril (PRINIVIL;ZESTRIL) 30 MG tablet;  Take 1 tablet by mouth daily    Essential hypertension  -     Basic Metabolic Panel; Future  -     Hepatic Function Panel; Future  -     Lipid Panel; Future  -     lisinopril (PRINIVIL;ZESTRIL) 30 MG tablet; Take 1 tablet by mouth daily    Dyslipidemia    Migraine syndrome  -     topiramate ER (TROKENDI XR) 25 MG CP24; Take 25 mg by mouth daily    Anxiety and depression    Vitamin D deficiency    Irritable bowel syndrome, unspecified type    Gastroesophageal reflux disease, unspecified whether esophagitis present      She is going to return for fasting labs. Patient states she is otherwise doing well; has no further questions or concerns at this visit. Encouraged to contact office with any concerns prior to next visit.      Return in about 6 months (around 12/9/2022), or if symptoms worsen or fail to improve, for Follow up, Annual Physical.    PEE Cheng - CNP

## 2022-06-17 NOTE — TELEPHONE ENCOUNTER
Pt needs refills of her baclofen (LIORESAL) 10 MG tablet and metoprolol succinate (TOPROL XL) 25 MG extended release tablet

## 2022-06-21 RX ORDER — METOPROLOL SUCCINATE 25 MG/1
50 TABLET, EXTENDED RELEASE ORAL DAILY
Qty: 90 TABLET | Refills: 1 | Status: SHIPPED | OUTPATIENT
Start: 2022-06-21 | End: 2022-12-22

## 2022-06-21 RX ORDER — BACLOFEN 10 MG/1
10 TABLET ORAL 3 TIMES DAILY PRN
Qty: 30 TABLET | Refills: 2 | Status: SHIPPED | OUTPATIENT
Start: 2022-06-21 | End: 2022-12-22

## 2022-06-22 ENCOUNTER — TELEPHONE (OUTPATIENT)
Dept: INTERNAL MEDICINE CLINIC | Facility: CLINIC | Age: 47
End: 2022-06-22

## 2022-06-22 NOTE — TELEPHONE ENCOUNTER
Prescription for metoprolol was for only 90 table, patient takes 1po bid. Rx will be changed from 90 tablets to 180 tablets.

## 2022-08-04 DIAGNOSIS — I10 ESSENTIAL HYPERTENSION: Primary | ICD-10-CM

## 2022-08-04 RX ORDER — LOSARTAN POTASSIUM 50 MG/1
75 TABLET ORAL DAILY
Qty: 135 TABLET | Refills: 1 | Status: SHIPPED | OUTPATIENT
Start: 2022-08-04

## 2022-08-29 ENCOUNTER — TELEPHONE (OUTPATIENT)
Dept: INTERNAL MEDICINE CLINIC | Facility: CLINIC | Age: 47
End: 2022-08-29

## 2022-09-15 LAB
CHOLEST SERPL-MCNC: 134 MG/DL
EST. AVERAGE GLUCOSE BLD GHB EST-MCNC: 140 MG/DL
GLUCOSE SERPL-MCNC: 124 MG/DL (ref 65–100)
HBA1C MFR BLD: 6.5 % (ref 4.8–5.6)
HDLC SERPL-MCNC: 31 MG/DL (ref 40–60)
LDLC SERPL CALC-MCNC: 63.4 MG/DL
TRIGL SERPL-MCNC: 198 MG/DL (ref 35–150)

## 2022-11-04 RX ORDER — PANTOPRAZOLE SODIUM 40 MG/1
40 TABLET, DELAYED RELEASE ORAL DAILY
Qty: 90 TABLET | Refills: 1 | Status: SHIPPED | OUTPATIENT
Start: 2022-11-04

## 2022-11-04 NOTE — TELEPHONE ENCOUNTER
[facility] called on behalf of the pt; states that she needs refills of her  pantoprazole (PROTONIX) 40 MG tablet prescription. If a 90 day supply could be sent.

## 2022-11-07 RX ORDER — LEVONORGESTREL AND ETHINYL ESTRADIOL 100-20(84)
KIT ORAL
Qty: 1 PACKET | Refills: 0 | Status: SHIPPED | OUTPATIENT
Start: 2022-11-07

## 2022-11-16 ENCOUNTER — HOSPITAL ENCOUNTER (OUTPATIENT)
Dept: MAMMOGRAPHY | Age: 47
Discharge: HOME | End: 2022-11-19
Payer: COMMERCIAL

## 2022-11-16 DIAGNOSIS — Z12.31 ENCOUNTER FOR SCREENING MAMMOGRAM FOR BREAST CANCER: ICD-10-CM

## 2022-11-16 PROCEDURE — 77063 BREAST TOMOSYNTHESIS BI: CPT

## 2022-11-23 RX ORDER — CITALOPRAM 40 MG/1
40 TABLET ORAL DAILY
Qty: 90 TABLET | Refills: 1 | Status: SHIPPED | OUTPATIENT
Start: 2022-11-23

## 2022-11-23 NOTE — TELEPHONE ENCOUNTER
----- Message from Burak Dalton sent at 11/22/2022  8:44 PM EST -----  Regarding: Citalopram 40mg  Need refill for Citalopram my next scheduled appointment is in December. [id number] [facility].  Thanks

## 2022-11-28 NOTE — TELEPHONE ENCOUNTER
----- Message from [facility] sent at 11/28/2022  3:50 PM EST -----  Subject: Refill Request    QUESTIONS  Name of Medication? atorvastatin (LIPITOR) 20 MG tablet  Patient-reported dosage and instructions? Take 20 mg by mouth  How many days do you have left? 0  Preferred Pharmacy? [address] phone number (if available)? [phone]  Additional Information for Provider? Pt is currently out  ---------------------------------------------------------------------------  --------------,  Name of Medication? cyclobenzaprine (FLEXERIL) 10 MG tablet  Patient-reported dosage and instructions? Take 10 mg by mouth  How many days do you have left? 14  Preferred Pharmacy? [address] phone number (if available)? [phone]  ---------------------------------------------------------------------------  --------------,  Name of Medication? metFORMIN (GLUCOPHAGE) 500 MG tablet  Patient-reported dosage and instructions? Take 500 mg by mouth 2 times   daily (with meals)  How many days do you have left? 14  Preferred Pharmacy? [address] phone number (if available)? [phone]  ---------------------------------------------------------------------------  --------------,  Name of Medication? metoprolol succinate (TOPROL XL) 25 MG extended   release tablet  Patient-reported dosage and instructions? Take 2 tablets by mouth daily  How many days do you have left? 14  Preferred Pharmacy? [address] phone number (if available)? [phone]  Additional Information for Provider? Has an appt on 1/5/2023  ---------------------------------------------------------------------------  --------------  CALL BACK INFO  What is the best way for the office to contact you? OK to leave message on   voicemail  Preferred Call Back Phone Number? [phone]  ---------------------------------------------------------------------------  --------------  SCRIPT ANSWERS  Relationship to Patient?  Self

## 2022-11-29 RX ORDER — METOPROLOL SUCCINATE 25 MG/1
50 TABLET, EXTENDED RELEASE ORAL DAILY
Qty: 180 TABLET | Refills: 1 | Status: SHIPPED | OUTPATIENT
Start: 2022-11-29 | End: 2023-06-01

## 2022-11-29 RX ORDER — ATORVASTATIN CALCIUM 20 MG/1
20 TABLET, FILM COATED ORAL NIGHTLY
Qty: 90 TABLET | Refills: 1 | Status: SHIPPED | OUTPATIENT
Start: 2022-11-29

## 2022-11-29 RX ORDER — CYCLOBENZAPRINE HCL 10 MG
10 TABLET ORAL DAILY PRN
Qty: 30 TABLET | Refills: 2 | Status: SHIPPED | OUTPATIENT
Start: 2022-11-29

## 2023-01-05 ENCOUNTER — OFFICE VISIT (OUTPATIENT)
Dept: INTERNAL MEDICINE CLINIC | Facility: CLINIC | Age: 48
End: 2023-01-05
Payer: COMMERCIAL

## 2023-01-05 VITALS
OXYGEN SATURATION: 98 % | DIASTOLIC BLOOD PRESSURE: 79 MMHG | HEIGHT: 63 IN | WEIGHT: 166 LBS | TEMPERATURE: 97.2 F | HEART RATE: 102 BPM | SYSTOLIC BLOOD PRESSURE: 121 MMHG | BODY MASS INDEX: 29.41 KG/M2

## 2023-01-05 DIAGNOSIS — K21.9 GASTROESOPHAGEAL REFLUX DISEASE, UNSPECIFIED WHETHER ESOPHAGITIS PRESENT: ICD-10-CM

## 2023-01-05 DIAGNOSIS — E78.5 DYSLIPIDEMIA: ICD-10-CM

## 2023-01-05 DIAGNOSIS — I10 ESSENTIAL HYPERTENSION: ICD-10-CM

## 2023-01-05 DIAGNOSIS — F32.A ANXIETY AND DEPRESSION: ICD-10-CM

## 2023-01-05 DIAGNOSIS — F41.9 ANXIETY AND DEPRESSION: ICD-10-CM

## 2023-01-05 DIAGNOSIS — E11.9 TYPE 2 DIABETES MELLITUS WITHOUT COMPLICATION, WITHOUT LONG-TERM CURRENT USE OF INSULIN (HCC): Primary | ICD-10-CM

## 2023-01-05 DIAGNOSIS — G43.909 MIGRAINE SYNDROME: ICD-10-CM

## 2023-01-05 PROCEDURE — 3074F SYST BP LT 130 MM HG: CPT | Performed by: NURSE PRACTITIONER

## 2023-01-05 PROCEDURE — 3078F DIAST BP <80 MM HG: CPT | Performed by: NURSE PRACTITIONER

## 2023-01-05 PROCEDURE — 99214 OFFICE O/P EST MOD 30 MIN: CPT | Performed by: NURSE PRACTITIONER

## 2023-01-05 RX ORDER — METOPROLOL SUCCINATE 25 MG/1
50 TABLET, EXTENDED RELEASE ORAL DAILY
Qty: 180 TABLET | Refills: 1 | Status: SHIPPED | OUTPATIENT
Start: 2023-01-05 | End: 2023-07-08

## 2023-01-05 RX ORDER — PANTOPRAZOLE SODIUM 40 MG/1
40 TABLET, DELAYED RELEASE ORAL DAILY
Qty: 90 TABLET | Refills: 1 | Status: SHIPPED | OUTPATIENT
Start: 2023-01-05

## 2023-01-05 RX ORDER — ATORVASTATIN CALCIUM 20 MG/1
20 TABLET, FILM COATED ORAL NIGHTLY
Qty: 90 TABLET | Refills: 1 | Status: SHIPPED | OUTPATIENT
Start: 2023-01-05

## 2023-01-05 RX ORDER — LOSARTAN POTASSIUM 50 MG/1
75 TABLET ORAL DAILY
Qty: 135 TABLET | Refills: 1 | Status: SHIPPED | OUTPATIENT
Start: 2023-01-05

## 2023-01-05 RX ORDER — CITALOPRAM 40 MG/1
40 TABLET ORAL DAILY
Qty: 90 TABLET | Refills: 1 | Status: SHIPPED | OUTPATIENT
Start: 2023-01-05

## 2023-01-05 ASSESSMENT — PATIENT HEALTH QUESTIONNAIRE - PHQ9
8. MOVING OR SPEAKING SO SLOWLY THAT OTHER PEOPLE COULD HAVE NOTICED. OR THE OPPOSITE, BEING SO FIGETY OR RESTLESS THAT YOU HAVE BEEN MOVING AROUND A LOT MORE THAN USUAL: 0
9. THOUGHTS THAT YOU WOULD BE BETTER OFF DEAD, OR OF HURTING YOURSELF: 0
SUM OF ALL RESPONSES TO PHQ QUESTIONS 1-9: 0
SUM OF ALL RESPONSES TO PHQ QUESTIONS 1-9: 0
2. FEELING DOWN, DEPRESSED OR HOPELESS: 0
7. TROUBLE CONCENTRATING ON THINGS, SUCH AS READING THE NEWSPAPER OR WATCHING TELEVISION: 0
4. FEELING TIRED OR HAVING LITTLE ENERGY: 0
SUM OF ALL RESPONSES TO PHQ QUESTIONS 1-9: 0
6. FEELING BAD ABOUT YOURSELF - OR THAT YOU ARE A FAILURE OR HAVE LET YOURSELF OR YOUR FAMILY DOWN: 0
3. TROUBLE FALLING OR STAYING ASLEEP: 0
SUM OF ALL RESPONSES TO PHQ QUESTIONS 1-9: 0
10. IF YOU CHECKED OFF ANY PROBLEMS, HOW DIFFICULT HAVE THESE PROBLEMS MADE IT FOR YOU TO DO YOUR WORK, TAKE CARE OF THINGS AT HOME, OR GET ALONG WITH OTHER PEOPLE: 0
5. POOR APPETITE OR OVEREATING: 0

## 2023-01-05 ASSESSMENT — ENCOUNTER SYMPTOMS
SHORTNESS OF BREATH: 0
WHEEZING: 0
ABDOMINAL PAIN: 0
VOMITING: 0
NAUSEA: 0
DIARRHEA: 0
COUGH: 0

## 2023-01-05 NOTE — PROGRESS NOTES
[facility]  Office Visit Note    Subjective:  Chief Complaint   Patient presents with    Diabetes     6 month follow up        Patient ID: Chayo Mcmillan is a 56 y.o. female presenting to the office for the above. 60-year-old female for follow up. Was a patient of Dr. Kulwant Persaud. 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 ; two children (19yo son and 16yo daughter in 2023). Diabetes--  Diagnosed 2020. Last A1c 6.5% September 2022 (7.0% February 2022, 7.2% August 2021, 7.1% February 2021). Taking metformin 750mg BID; tolerating well (1,000mg BID caused abdominal pain). *Urine microalbumin level: February 2022    *Diabetic eye exam: scheduled at [facility]   *Diabetic foot exam: 9/22/2020   Home sugars running <120 fasting. --Advise healthy diabetic diet, regular exercise. Hypertension / Tachycardia / history of SVT--  Treated with metoprolol 50mg daily and losartan 75mg daily. Tolerating well without report of side effects. She had ablation in 2009. Was restarted on metoprolol for increasing tachycardia; states she feels much better on the metoprolol. Denies chest pain, palpitations, shortness of breath, peripheral edema, dizziness. --Advise low sodium diet, regular exercise, weight loss. Notify office if home BP consistently >130/90. Hyperlipidemia--  Treated with atorvastatin 20mg nightly. Tolerating well without report of side effects, including myalgias. Last lipid panel September 2022, with cholesterol 134, LDL 63. --Encouraged to follow a healthy low-fat diet, avoiding saturated/trans fats/fried and fatty foods, get regular exercise, and weight loss. Migraines--   She started Trokendi (topiramate ER) 25mg daily in March 2022; tolerating well without report of side effects. Uses Imitrex prn (about once a month), with good relief. Migraines much improved since starting Trokendi. No auras.    Flexeril helps with her right occipital pain, possible occipital neuralgia. --Stable      Anxiety / Depression--   Patient currently treated with citalopram 40mg daily; tolerating well without report of side effects. Mood is stable. States the medication is working well for her. Denies thoughts of hurting herself. GERD--  Stable on pantoprazole 40mg daily. No red flag symptoms. IBS--  Stable with Levsin prn for diarrhea. Takes a few times a month. Arhthritis/chronic pain--  Takes meloxicam prn. Completed PT. Vitamin D deficiency--  Level was 11.9 in February 2021. Was prescribed Drisdol; completed 12 weeks therapy. Now taking OTC vitamin D(3) 1000 units daily. Level was 56.8 in February 2022. Follows with Dr. Lissy Chau for gynecological care. On Lo-Seasonique. Had some breakthrough bleeding August 2021, transvaginal ultrasound was unremarkable. Health Maintenance:  *Colorectal cancer screening: Denies family history of colorectal cancer, changes to bowel habits, blood in stool. Cologuard negative September 2021. *Mammogram: November 2022, no evidence of malignancy   *Pap: February 2020   *TDAP: had within past 10 years (around 2017) per patient work   *Flu: received at work per patient report   *Jeri Dela Cruz: completed 8/4/21       History:   Allergies   Allergen Reactions    Lisinopril Cough       Past Medical History:   Diagnosis Date    Depression     Dyslipidemia     Essential hypertension     GERD (gastroesophageal reflux disease)     H/O cardiac radiofrequency ablation 2009    H/O paroxysmal supraventricular tachycardia 2009    Migraine headache     Type 2 diabetes mellitus ([facility] [address]        Past Surgical History:   Procedure Laterality Date    [place] UNLISTED PROCEDURE CARDIAC SURGERY  2009    Ablation-SVT       Family History   Problem Relation Age of Onset    Cancer Father         Renal    Hypertension Father     Heart Disease Father     Diabetes Father     Breast Cancer Neg Hx     Thyroid Disease Mother Social History     Tobacco Use   Smoking Status Former    Packs/day: 0.50    Years: 15.00    Pack years: 7.50    Types: Cigarettes   Smokeless Tobacco Never       Social History     Substance and Sexual Activity   Alcohol Use Not Currently       Current Outpatient Medications   Medication Sig Dispense Refill    atorvastatin (LIPITOR) 20 MG tablet Take 1 tablet by mouth at bedtime 90 tablet 1    citalopram (CELEXA) 40 MG tablet Take 1 tablet by mouth daily 90 tablet 1    losartan (COZAAR) 50 MG tablet Take 1.5 tablets by mouth daily 135 tablet 1    metFORMIN (GLUCOPHAGE) 500 MG tablet Take 1.5 tablets by mouth 2 times daily (with meals) 270 tablet 1    metoprolol succinate (TOPROL XL) 25 MG extended release tablet Take 2 tablets by mouth daily 180 tablet 1    pantoprazole (PROTONIX) 40 MG tablet Take 1 tablet by mouth daily 90 tablet 1    topiramate ER (TROKENDI XR) 25 MG CP24 Take 25 mg by mouth daily 90 capsule 1    cyclobenzaprine (FLEXERIL) 10 MG tablet Take 1 tablet by mouth daily as needed for Muscle spasms 30 tablet 2    Levonorgest-Eth Estrad 91-Day 0.1-0.02 & 0.01 MG TABS TAKE 1 TABLET BY MOUTH EVERY DAY 1 packet 0    Lancets MISC Use to monitor blood sugar daily. Dx: E11. Insurance preference      Hyoscyamine Sulfate SL 0.125 MG SUBL Take 0.125 mg by mouth every 6 hours as needed      meloxicam (MOBIC) 15 MG tablet Take 15 mg by mouth daily as needed      SUMAtriptan (IMITREX) 100 MG tablet Take 100 mg by mouth as needed       No current facility-administered medications for this visit. Review of Systems:  Review of Systems   Constitutional:  Negative for activity change, appetite change, fever and unexpected weight change. HENT:  Negative for congestion. Respiratory:  Negative for cough, shortness of breath and wheezing. Cardiovascular:  Negative for chest pain and palpitations. Gastrointestinal:  Negative for abdominal pain, diarrhea, nausea and vomiting.    Skin:  Negative for pallor. Neurological:  Positive for headaches (migraines, much improved). Negative for dizziness, seizures, syncope and weakness. Psychiatric/Behavioral:  Negative for dysphoric mood. The patient is not nervous/anxious. See HPI for other pertinent positives and negatives. Objective:  Vitals:    01/05/23 1004 01/05/23 1009   BP: (!) 152/87 121/79   Pulse: (!) 102    Temp: 97.2 °F (36.2 °C)    TempSrc: Temporal    SpO2: 98%    Weight: 166 lb (75.3 kg)    Height: 5' 3\" (1.6 m)        Body mass index is 29.41 kg/m². Physical Exam  Vitals and nursing note reviewed. Constitutional:       General: She is not in acute distress. Appearance: Normal appearance. She is not ill-appearing or diaphoretic. HENT:      Head: Normocephalic and atraumatic. Eyes:      General: No scleral icterus. Right eye: No discharge. Left eye: No discharge. Cardiovascular:      Rate and Rhythm: Normal rate and regular rhythm. Heart sounds: Normal heart sounds. Pulmonary:      Effort: Pulmonary effort is normal. No respiratory distress. Breath sounds: Normal breath sounds. No wheezing, rhonchi or rales. Abdominal:      General: Bowel sounds are normal.   Musculoskeletal:      Right lower leg: No edema. Left lower leg: No edema. Skin:     General: Skin is warm and dry. Neurological:      Mental Status: She is alert and oriented to person, place, and time.    Psychiatric:         Mood and Affect: Mood normal.         Speech: Speech normal.         Behavior: Behavior normal.                Lab Results   Component Value Date    WBC 7.6 02/11/2022    HGB 14.7 02/11/2022    HCT 43.6 02/11/2022     (H) 02/11/2022     02/11/2022   ,   Lab Results   Component Value Date/Time     02/11/2022 11:30 AM    K 4.6 02/11/2022 11:30 AM     02/11/2022 11:30 AM    CO2 22 02/11/2022 11:30 AM    BUN 9 02/11/2022 11:30 AM    CREATININE 0.75 02/11/2022 11:30 AM    GLUCOSE 124 09/15/2022 06:00 AM    CALCIUM 9.8 02/11/2022 11:30 AM    ,   Lab Results   Component Value Date    TSH 1.300 08/25/2021   ,   Lab Results   Component Value Date    ALT 39 (H) 08/25/2021    AST 33 08/25/2021    ALKPHOS 69 08/25/2021    BILITOT 0.4 08/25/2021   ,   Hemoglobin A1C   Date Value Ref Range Status   09/15/2022 6.5 (H) 4.8 - 5.6 % Final   ,     PHQ-9  1/5/2023   Little interest or pleasure in doing things -   Little interest or pleasure in doing things -   Feeling down, depressed, or hopeless 0   Trouble falling or staying asleep, or sleeping too much 0   Feeling tired or having little energy 0   Poor appetite or overeating 0   Feeling bad about yourself - or that you are a failure or have let yourself or your family down 0   Trouble concentrating on things, such as reading the newspaper or watching television 0   Moving or speaking so slowly that other people could have noticed. Or the opposite - being so fidgety or restless that you have been moving around a lot more than usual 0   Thoughts that you would be better off dead, or of hurting yourself in some way 0   PHQ-2 Score -   Total Score PHQ 2 -   PHQ-9 Total Score 0   If you checked off any problems, how difficult have these problems made it for you to do your work, take care of things at home, or get along with other people? 0        Assessment/Plan:      Health Maintenance Due   Topic Date Due    Pneumococcal 0-64 years Vaccine (1 - PCV) Never done    HIV screen  Never done    Hepatitis C screen  Never done    Hepatitis B vaccine (1 of 3 - Risk 3-dose series) Never done    DTaP/Tdap/Td vaccine (1 - Tdap) Never done    Diabetic foot exam  09/22/2021    COVID-19 Vaccine (2 - Booster for Colleen series) 09/29/2021    Flu vaccine (1) 08/01/2022          Harini Batres was seen today for diabetes.     Diagnoses and all orders for this visit:    Type 2 diabetes mellitus without complication, without long-term current use of insulin (HCC)  -     metFORMIN (GLUCOPHAGE) 500 MG tablet; Take 1.5 tablets by mouth 2 times daily (with meals)    Essential hypertension  -     losartan (COZAAR) 50 MG tablet; Take 1.5 tablets by mouth daily  -     metoprolol succinate (TOPROL XL) 25 MG extended release tablet; Take 2 tablets by mouth daily    Migraine syndrome  -     topiramate ER (TROKENDI XR) 25 MG CP24; Take 25 mg by mouth daily    Dyslipidemia  -     atorvastatin (LIPITOR) 20 MG tablet; Take 1 tablet by mouth at bedtime    Anxiety and depression  -     citalopram (CELEXA) 40 MG tablet; Take 1 tablet by mouth daily    Gastroesophageal reflux disease, unspecified whether esophagitis present  -     pantoprazole (PROTONIX) 40 MG tablet; Take 1 tablet by mouth daily      Patient states she is otherwise doing well; has no further questions or concerns at this visit. Encouraged to contact office with any concerns prior to next visit. Return in about 6 months (around 7/5/2023), or if symptoms worsen or fail to improve, for Annual Physical, Follow up, Fasting labs.     PEE Seals - CNP

## 2023-01-31 NOTE — PROGRESS NOTES
MADIHA Daly is a 56 y.o. female seen for annual GYN exam.  She works in pre-assessment    Past Medical History, Past Surgical History, Family history, Social History, and Medications were all reviewed with the patient today and updated as necessary. Current Outpatient Medications   Medication Sig    Levonorgest-Eth Estrad 91-Day 0.1-0.02 & 0.01 MG TABS TAKE 1 TABLET BY MOUTH EVERY DAY    atorvastatin (LIPITOR) 20 MG tablet Take 1 tablet by mouth at bedtime    citalopram (CELEXA) 40 MG tablet Take 1 tablet by mouth daily    losartan (COZAAR) 50 MG tablet Take 1.5 tablets by mouth daily    metFORMIN (GLUCOPHAGE) 500 MG tablet Take 1.5 tablets by mouth 2 times daily (with meals)    metoprolol succinate (TOPROL XL) 25 MG extended release tablet Take 2 tablets by mouth daily    pantoprazole (PROTONIX) 40 MG tablet Take 1 tablet by mouth daily    topiramate ER (TROKENDI XR) 25 MG CP24 Take 25 mg by mouth daily    cyclobenzaprine (FLEXERIL) 10 MG tablet Take 1 tablet by mouth daily as needed for Muscle spasms    Lancets MISC Use to monitor blood sugar daily. Dx: E11. Insurance preference    Hyoscyamine Sulfate SL 0.125 MG SUBL Take 0.125 mg by mouth every 6 hours as needed    meloxicam (MOBIC) 15 MG tablet Take 15 mg by mouth daily as needed    SUMAtriptan (IMITREX) 100 MG tablet Take 100 mg by mouth as needed     No current facility-administered medications for this visit.      Allergies   Allergen Reactions    Lisinopril Cough     Past Medical History:   Diagnosis Date    Depression     Dyslipidemia     Essential hypertension     GERD (gastroesophageal reflux disease)     H/O cardiac radiofrequency ablation 2009    H/O paroxysmal supraventricular tachycardia 2009    Migraine headache     Type 2 diabetes mellitus ([facility] [address]      Past Surgical History:   Procedure Laterality Date    [place] UNLISTED PROCEDURE CARDIAC SURGERY  2009    Ablation-SVT     Family History   Problem Relation Age of Onset    Cancer Father Renal    Hypertension Father     Heart Disease Father     Diabetes Father     Breast Cancer Neg Hx     Thyroid Disease Mother       Social History     Tobacco Use    Smoking status: Former     Packs/day: 0.50     Years: 15.00     Pack years: 7.50     Types: Cigarettes    Smokeless tobacco: Never   Substance Use Topics    Alcohol use: Not Currently       Social History     Substance and Sexual Activity   Sexual Activity Yes    Partners: Male    Birth control/protection: Pill     OB History    Para Term  AB Living   2 2 0 0 0 2   SAB IAB Ectopic Molar Multiple Live Births   0 0 0 0 0 0      # Outcome Date GA Lbr Ronny/2nd Weight Sex Delivery Anes PTL Lv   2 Para            1 Para               Obstetric Comments   NVD       Health Maintenance  Mammogram 2022  Colonoscopy: Cologuard-Inconclusive will repeat  Bone Density:  Pap smear: 21      Review of Systems  General: Not Present- Chills, Fever, Fatigue, Insomnia, Hot flashes/Night sweats, Weight gain  Skin: Not Present- Bruising, Change in Wart/Mole, Excessive Sweating, Itching, Nail Changes, New Lesions, Rash, Skin Color Changes and Ulcer. HEENT: Not Present- Headache, Blurred Vision, Double Vision, Glaucoma, Visual Disturbances, Hearing Loss, Ringing in the Ears, Vertigo, Nose Bleed, Bleeding Gums, Hoarseness and Sore Throat. Neck: Not Present- Neck Pain and Neck Swelling. Respiratory: Not Present- Cough, Difficulty Breathing and Difficulty Breathing on Exertion. Breast: Not Present- Breast Mass, Breast Pain, Breast Swelling, Nipple Discharge, Nipple Pain, Recent Breast Size Changes and Skin Changes. Cardiovascular: Not Present- Abnormal Blood Pressure, Chest Pain, Edema, Fainting / Blacking Out, Palpitations, Shortness of Breath and Swelling of Extremities.   Gastrointestinal: Not Present- Abdominal Pain, Abdominal Swelling, Bloating, Change in Bowel Habits, Constipation, Diarrhea, Difficulty Swallowing, Gets full quickly at meals, Nausea, Rectal Bleeding and Vomiting. Female Genitourinary: Not Present- Dysmenorrhea, Dyspareunia, Decreased libido, Excessive Menstrual Bleeding, Menstrual Irregularities, Pelvic Pain, Urinary Complaints, Vaginal Discharge, Vaginal itching/burning, Vaginal odor  Musculoskeletal: Not Present- Joint Pain and Muscle Pain. Neurological: Not Present- Dizziness, Fainting, Headaches and Seizures. Psychiatric: Not Present- Anxiety, Depression, Mood changes and Panic Attacks. Endocrine: Not Present- Appetite Changes, Cold Intolerance, Excessive Thirst, Excessive Urination and Heat Intolerance. Hematology: Not Present- Abnormal Bleeding, Easy Bruising and Enlarged Lymph Nodes. PHYSICAL EXAM:     /82   Ht 5' 3\" (1.6 m)   Wt 167 lb (75.8 kg)   BMI 29.58 kg/m²     Physical Exam   General   Mental Status - Alert. General Appearance - Cooperative. Integumentary   General Characteristics: Overall examination of the patient's skin reveals - no rashes and no suspicious lesions. Head and Neck  Head - normocephalic, atraumatic with no lesions or palpable masses. Neck Note: Normal   Thyroid   Gland Characteristics - normal size and consistency and no palpable nodules. Chest and Lung Exam   Chest and lung exam reveals - on auscultation, normal breath sounds, no adventitious sounds and normal vocal resonance. Breast   Breast - Left - Normal. Right - Normal.     Cardiovascular   Cardiovascular examination reveals - normal heart sounds, regular rate and rhythm with no murmurs. Abdomen   Inspection: - Inspection Normal.   Palpation/Percussion: Palpation and Percussion of the abdomen reveal - Non Tender, No Rebound tenderness, No Rigidity (guarding), No hepatosplenomegaly, No Palpable abdominal masses and Soft.    Auscultation: Auscultation of the abdomen reveals - Bowel sounds normal.     Female Genitourinary     External Genitalia   Vulva: - Normal. Perineum - Normal. Bartholin's Gland - Bilateral - Normal. Clitoris - Normal.   Introitus: Characteristics - Normal.   Urethra: Characteristics - Normal.     Speculum & Bimanual   Vagina: Vaginal Mucosa - Normal.   Vaginal Wall: - Normal.   Vaginal Lesions - None. Cervix: Characteristics - Normal.   Uterus: Characteristics - Normal.   Adnexa: - Normal.   Bladder - Normal.     Peripheral Vascular   Normal    Neuropsychiatric   Examination of related systems reveals - The patient is well-nourished and well-groomed. Mental status exam performed with findings of - Oriented X3 with appropriate mood and affect. Musculoskeletal  Normal      General Lymphatics  Normal           Medical problems and test results were reviewed with the patient today. ASSESSMENT and PLAN    1. Well woman exam  2. Screening for malignant neoplasm of cervix  -     PAP LB, Reflex HPV ASCUS  3. Encounter for surveillance of contraceptive pills  -     Levonorgest-Eth Estrad 91-Day 0.1-0.02 & 0.01 MG TABS; TAKE 1 TABLET BY MOUTH EVERY DAY, Disp-1 packet, R-4Normal         No follow-ups on file.        Nicole Kapadia MD  2/1/2023

## 2023-02-01 ENCOUNTER — OFFICE VISIT (OUTPATIENT)
Dept: GYNECOLOGY | Age: 48
End: 2023-02-01
Payer: COMMERCIAL

## 2023-02-01 VITALS
SYSTOLIC BLOOD PRESSURE: 118 MMHG | BODY MASS INDEX: 29.59 KG/M2 | DIASTOLIC BLOOD PRESSURE: 82 MMHG | HEIGHT: 63 IN | WEIGHT: 167 LBS

## 2023-02-01 DIAGNOSIS — Z12.4 SCREENING FOR MALIGNANT NEOPLASM OF CERVIX: ICD-10-CM

## 2023-02-01 DIAGNOSIS — Z30.41 ENCOUNTER FOR SURVEILLANCE OF CONTRACEPTIVE PILLS: ICD-10-CM

## 2023-02-01 DIAGNOSIS — Z01.419 WELL WOMAN EXAM: Primary | ICD-10-CM

## 2023-02-01 PROCEDURE — 3079F DIAST BP 80-89 MM HG: CPT | Performed by: OBSTETRICS & GYNECOLOGY

## 2023-02-01 PROCEDURE — 99396 PREV VISIT EST AGE 40-64: CPT | Performed by: OBSTETRICS & GYNECOLOGY

## 2023-02-01 PROCEDURE — 3074F SYST BP LT 130 MM HG: CPT | Performed by: OBSTETRICS & GYNECOLOGY

## 2023-02-01 RX ORDER — LEVONORGESTREL AND ETHINYL ESTRADIOL 100-20(84)
KIT ORAL
Qty: 1 PACKET | Refills: 4 | Status: SHIPPED | OUTPATIENT
Start: 2023-02-01

## 2023-02-08 ENCOUNTER — PATIENT MESSAGE (OUTPATIENT)
Dept: INTERNAL MEDICINE CLINIC | Facility: CLINIC | Age: 48
End: 2023-02-08

## 2023-02-08 DIAGNOSIS — R51.9 OCCIPITAL HEADACHE: ICD-10-CM

## 2023-02-08 DIAGNOSIS — G43.909 MIGRAINE SYNDROME: Primary | ICD-10-CM

## 2023-02-08 RX ORDER — SUMATRIPTAN 100 MG/1
100 TABLET, FILM COATED ORAL PRN
Qty: 30 TABLET | Refills: 1 | Status: SHIPPED | OUTPATIENT
Start: 2023-02-08

## 2023-05-02 ENCOUNTER — OFFICE VISIT (OUTPATIENT)
Dept: NEUROLOGY | Age: 48
End: 2023-05-02
Payer: COMMERCIAL

## 2023-05-02 VITALS
HEART RATE: 98 BPM | OXYGEN SATURATION: 96 % | WEIGHT: 169 LBS | SYSTOLIC BLOOD PRESSURE: 127 MMHG | DIASTOLIC BLOOD PRESSURE: 85 MMHG | HEIGHT: 63 IN | BODY MASS INDEX: 29.95 KG/M2

## 2023-05-02 DIAGNOSIS — G43.709 CHRONIC MIGRAINE WITHOUT AURA WITHOUT STATUS MIGRAINOSUS, NOT INTRACTABLE: ICD-10-CM

## 2023-05-02 DIAGNOSIS — M54.2 CERVICALGIA: ICD-10-CM

## 2023-05-02 DIAGNOSIS — M54.81 OCCIPITAL NEURALGIA OF RIGHT SIDE: Primary | ICD-10-CM

## 2023-05-02 PROCEDURE — 3074F SYST BP LT 130 MM HG: CPT | Performed by: NURSE PRACTITIONER

## 2023-05-02 PROCEDURE — 3079F DIAST BP 80-89 MM HG: CPT | Performed by: NURSE PRACTITIONER

## 2023-05-02 PROCEDURE — 99204 OFFICE O/P NEW MOD 45 MIN: CPT | Performed by: NURSE PRACTITIONER

## 2023-05-02 RX ORDER — CYCLOBENZAPRINE HCL 5 MG
5 TABLET ORAL 3 TIMES DAILY PRN
Qty: 30 TABLET | Refills: 0 | Status: SHIPPED | OUTPATIENT
Start: 2023-05-02 | End: 2023-05-12

## 2023-05-02 RX ORDER — TOPIRAMATE 50 MG/1
CAPSULE, EXTENDED RELEASE ORAL
Qty: 70 CAPSULE | Refills: 0 | Status: SHIPPED | OUTPATIENT
Start: 2023-05-02 | End: 2023-06-11

## 2023-05-02 ASSESSMENT — PATIENT HEALTH QUESTIONNAIRE - PHQ9
SUM OF ALL RESPONSES TO PHQ QUESTIONS 1-9: 0
1. LITTLE INTEREST OR PLEASURE IN DOING THINGS: 0
SUM OF ALL RESPONSES TO PHQ QUESTIONS 1-9: 0
SUM OF ALL RESPONSES TO PHQ QUESTIONS 1-9: 0
2. FEELING DOWN, DEPRESSED OR HOPELESS: 0
SUM OF ALL RESPONSES TO PHQ9 QUESTIONS 1 & 2: 0
SUM OF ALL RESPONSES TO PHQ QUESTIONS 1-9: 0

## 2023-05-02 ASSESSMENT — ENCOUNTER SYMPTOMS
EYES NEGATIVE: 1
ALLERGIC/IMMUNOLOGIC NEGATIVE: 1
GASTROINTESTINAL NEGATIVE: 1
RESPIRATORY NEGATIVE: 1

## 2023-05-02 NOTE — PATIENT INSTRUCTIONS
Headache Education:   Instructed the patient on over-the-counter headache management medications including: CoQ10, magnesium oxide, and butterbur. To avoid a pain medication overuse headache trying not to take pain medicines more than 3 doses a week. Avoid use of Fioricet or opioids to treat headaches as this can increase risk for rebound headaches. To help relieve headache symptoms without taking pain medicine lie down under darkroom and drink glass of water. Consider lifestyle modification including good sleep hygiene, routine medial schedules, regular exercise and managing triggers. Keep a headache diary  to reveal triggers and possible patterns. Triggers may be: Food, stress, perfumes, alcohol, or even chocolate. Drink plenty of water and try to get 8 hours of sleep each night to reduce risk factors that may cause headaches. Samples of  Ubrelvy 100 mg provided to patient. Advised not to take medication on same day and to update office on efficacy. Instructions:  Ubrelvy 100 mg daily as needed for migraine abortive therapy. May repeat for 1 dose in 2 hours if needed. Not to exceed 200mg/24 hours.

## 2023-05-02 NOTE — PROGRESS NOTES
Patient: Gualberto Govea  MRN: [id number]  : 1975    CC: Headaches    HPI:   Gualberto Govea is a 56 y. o.yo female here for new patient evaluation for headaches. Referred by   PEE Givens. Headaches are located right occipital region and radiate toward the crown of head to frontal region on right. They began ~2 weeks ago. The current frequency of headaches: daily. Duration: all day. Severity is 8/10. Quality of headaches are described as sharp, throbbing. Associated symptoms: allodynia, cervicalgia. Denies thunderclap, changes in vision, photophobia, phonophobia, nausea, vomiting, tinnitus. Hx of migraine without aura. The headaches are exacerbated by movement of head. Factors that relieve the headaches are cold compress, flexeril 5 mg and motrin. Pain has improved with trokendi and imitrex, however has not full resolved. She does not like \"hangover effect\" of imitrex. Family Members with headache history: mother    Current Medications used for HA treatment: Trokendi, Imitrex, flexeril    Medications tried in the past: motrin,tylenol, imitrex    Associated medical problems: HTN, SVT s/p ablation, migraines, anxiety/depression, DM type II, former tobacco use    Previous Imaging: none    Previous Testing: none      Review of Systems   Review of Systems   Constitutional: Negative. HENT: Negative. Eyes: Negative. Respiratory: Negative. Cardiovascular: Negative. Gastrointestinal: Negative. Endocrine: Negative. Genitourinary: Negative. Musculoskeletal:  Positive for neck pain. Skin: Negative. Allergic/Immunologic: Negative. Neurological:  Positive for headaches. Hematological: Negative. Psychiatric/Behavioral:  Positive for dysphoric mood. Negative for sleep disturbance and suicidal ideas. The patient is nervous/anxious.         Past Medical History:  Past Medical History:   Diagnosis Date    Depression     Dyslipidemia     Essential hypertension

## 2023-05-03 ENCOUNTER — TELEPHONE (OUTPATIENT)
Dept: NEUROLOGY | Age: 48
End: 2023-05-03

## 2023-05-03 NOTE — TELEPHONE ENCOUNTER
[place], [address] called and needed to clarify the titration dose of the Trokendi XR. Pt will not be able to get some of the doses since they are capsules. Per NP Rama Sandoval, pt has 25 mg that she will be using to get the doses that she needs.

## 2023-05-25 ENCOUNTER — OFFICE VISIT (OUTPATIENT)
Dept: NEUROLOGY | Age: 48
End: 2023-05-25
Payer: COMMERCIAL

## 2023-05-25 VITALS
DIASTOLIC BLOOD PRESSURE: 96 MMHG | HEART RATE: 100 BPM | HEIGHT: 63 IN | OXYGEN SATURATION: 96 % | SYSTOLIC BLOOD PRESSURE: 165 MMHG | WEIGHT: 166.4 LBS | BODY MASS INDEX: 29.48 KG/M2

## 2023-05-25 DIAGNOSIS — G43.709 CHRONIC MIGRAINE WITHOUT AURA WITHOUT STATUS MIGRAINOSUS, NOT INTRACTABLE: ICD-10-CM

## 2023-05-25 DIAGNOSIS — M54.81 OCCIPITAL NEURALGIA OF RIGHT SIDE: Primary | ICD-10-CM

## 2023-05-25 DIAGNOSIS — M54.2 CERVICALGIA: ICD-10-CM

## 2023-05-25 PROCEDURE — 3080F DIAST BP >= 90 MM HG: CPT | Performed by: NURSE PRACTITIONER

## 2023-05-25 PROCEDURE — 3077F SYST BP >= 140 MM HG: CPT | Performed by: NURSE PRACTITIONER

## 2023-05-25 PROCEDURE — 99214 OFFICE O/P EST MOD 30 MIN: CPT | Performed by: NURSE PRACTITIONER

## 2023-05-25 RX ORDER — TOPIRAMATE 50 MG/1
50 CAPSULE, EXTENDED RELEASE ORAL DAILY
Qty: 90 CAPSULE | Refills: 3 | Status: SHIPPED | OUTPATIENT
Start: 2023-05-25

## 2023-05-25 RX ORDER — TOPIRAMATE 100 MG/1
100 CAPSULE, EXTENDED RELEASE ORAL DAILY
Qty: 270 CAPSULE | Refills: 2 | Status: SHIPPED | OUTPATIENT
Start: 2023-06-23 | End: 2023-05-25 | Stop reason: CLARIF

## 2023-05-25 ASSESSMENT — PATIENT HEALTH QUESTIONNAIRE - PHQ9
SUM OF ALL RESPONSES TO PHQ QUESTIONS 1-9: 0
SUM OF ALL RESPONSES TO PHQ9 QUESTIONS 1 & 2: 0
SUM OF ALL RESPONSES TO PHQ QUESTIONS 1-9: 0
1. LITTLE INTEREST OR PLEASURE IN DOING THINGS: 0
SUM OF ALL RESPONSES TO PHQ QUESTIONS 1-9: 0
SUM OF ALL RESPONSES TO PHQ QUESTIONS 1-9: 0
2. FEELING DOWN, DEPRESSED OR HOPELESS: 0

## 2023-05-25 ASSESSMENT — ENCOUNTER SYMPTOMS
GASTROINTESTINAL NEGATIVE: 1
RESPIRATORY NEGATIVE: 1

## 2023-05-25 NOTE — PROGRESS NOTES
[address] [facility]  [address], [address] [facility]      Chief Complaint   Patient presents with    Follow-up     occipital neuralgia       Emy Trevizo is a 56 y.o. female who presents for follow up for occipital neuralgia and hx of migraines. Interval history:    She is here today by herself. Continues to endorse allodynia along the right occiput and cervicalgia on the right. That radiates up to the crown of head and behind her right eye. She endorse mild improvement with flexeril, NSAIDs. Hx. Migraine- well controlled on trokendi 50 mg daily. Endorses 1-2 headaches since previous visit. Denies side effects. She tried [place] and [place] for abortive therapy, however the medication was not efficacious. Samples of Nurtec provided, however she did not take the medication. Stated she took her sumatriptan and noted relief, however stated she does not like the \"hang over\" effects after taking the medication.         Past Medical History:   Diagnosis Date    Depression     Dyslipidemia     Essential hypertension     GERD (gastroesophageal reflux disease)     H/O cardiac radiofrequency ablation 2009    H/O paroxysmal supraventricular tachycardia 2009    Migraine headache     Type 2 diabetes mellitus ([facility] [address]        Past Surgical History:   Procedure Laterality Date    [place] UNLISTED PROCEDURE CARDIAC SURGERY  2009    Ablation-SVT       Family History   Problem Relation Age of Onset    Cancer Father         Renal    Hypertension Father     Heart Disease Father     Diabetes Father     Breast Cancer Neg Hx     Thyroid Disease Mother        Social History     Socioeconomic History    Marital status:    Tobacco Use    Smoking status: Former     Packs/day: 0.50     Years: 15.00     Pack years: 7.50     Types: Cigarettes    Smokeless tobacco: Never   Substance and Sexual Activity    Alcohol use: Not Currently    Drug use: No    Sexual activity: Yes     Partners: Male     Birth control/protection:

## 2023-05-25 NOTE — PATIENT INSTRUCTIONS
Headache Education:   Instructed the patient on over-the-counter headache management medications including: CoQ10, magnesium oxide, and butterbur. To avoid a pain medication overuse headache trying not to take pain medicines more than 3 doses a week. Avoid use of Fioricet or opioids to treat headaches as this can increase risk for rebound headaches. To help relieve headache symptoms without taking pain medicine lie down under darkroom and drink glass of water. Consider lifestyle modification including good sleep hygiene, routine medial schedules, regular exercise and managing triggers. Keep a headache diary  to reveal triggers and possible patterns. Triggers may be: Food, stress, perfumes, alcohol, or even chocolate. Drink plenty of water and try to get 8 hours of sleep each night to reduce risk factors that may cause headaches.

## 2023-07-23 DIAGNOSIS — M54.2 CERVICALGIA: ICD-10-CM

## 2023-07-23 DIAGNOSIS — M54.81 OCCIPITAL NEURALGIA OF RIGHT SIDE: Primary | ICD-10-CM

## 2023-07-24 RX ORDER — CYCLOBENZAPRINE HCL 5 MG
TABLET ORAL
Qty: 30 TABLET | Refills: 0 | OUTPATIENT
Start: 2023-07-24

## 2023-08-02 ENCOUNTER — OFFICE VISIT (OUTPATIENT)
Dept: INTERNAL MEDICINE CLINIC | Facility: CLINIC | Age: 48
End: 2023-08-02
Payer: COMMERCIAL

## 2023-08-02 VITALS
OXYGEN SATURATION: 97 % | TEMPERATURE: 98.1 F | WEIGHT: 162.4 LBS | BODY MASS INDEX: 28.77 KG/M2 | HEIGHT: 63 IN | DIASTOLIC BLOOD PRESSURE: 86 MMHG | SYSTOLIC BLOOD PRESSURE: 161 MMHG | HEART RATE: 93 BPM

## 2023-08-02 DIAGNOSIS — Z00.00 ANNUAL PHYSICAL EXAM: Primary | ICD-10-CM

## 2023-08-02 DIAGNOSIS — E11.9 TYPE 2 DIABETES MELLITUS WITHOUT COMPLICATION, WITHOUT LONG-TERM CURRENT USE OF INSULIN (HCC): ICD-10-CM

## 2023-08-02 DIAGNOSIS — I10 ESSENTIAL HYPERTENSION: ICD-10-CM

## 2023-08-02 DIAGNOSIS — E78.5 DYSLIPIDEMIA: ICD-10-CM

## 2023-08-02 DIAGNOSIS — F41.9 ANXIETY AND DEPRESSION: ICD-10-CM

## 2023-08-02 DIAGNOSIS — K21.9 GASTROESOPHAGEAL REFLUX DISEASE, UNSPECIFIED WHETHER ESOPHAGITIS PRESENT: ICD-10-CM

## 2023-08-02 DIAGNOSIS — E55.9 VITAMIN D DEFICIENCY: ICD-10-CM

## 2023-08-02 DIAGNOSIS — G43.909 MIGRAINE SYNDROME: ICD-10-CM

## 2023-08-02 DIAGNOSIS — F32.A ANXIETY AND DEPRESSION: ICD-10-CM

## 2023-08-02 DIAGNOSIS — Z00.00 ANNUAL PHYSICAL EXAM: ICD-10-CM

## 2023-08-02 LAB
25(OH)D3 SERPL-MCNC: 38.9 NG/ML (ref 30–100)
ALBUMIN SERPL-MCNC: 3.9 G/DL (ref 3.5–5)
ALBUMIN/GLOB SERPL: 0.6 (ref 0.4–1.6)
ALP SERPL-CCNC: 71 U/L (ref 50–136)
ALT SERPL-CCNC: 49 U/L (ref 12–65)
ANION GAP SERPL CALC-SCNC: 5 MMOL/L (ref 2–11)
AST SERPL-CCNC: 33 U/L (ref 15–37)
BASOPHILS # BLD: 0.1 K/UL (ref 0–0.2)
BASOPHILS NFR BLD: 1 % (ref 0–2)
BILIRUB SERPL-MCNC: 0.5 MG/DL (ref 0.2–1.1)
BUN SERPL-MCNC: 9 MG/DL (ref 6–23)
CALCIUM SERPL-MCNC: 9.2 MG/DL (ref 8.3–10.4)
CHLORIDE SERPL-SCNC: 107 MMOL/L (ref 101–110)
CHOLEST SERPL-MCNC: 126 MG/DL
CO2 SERPL-SCNC: 25 MMOL/L (ref 21–32)
CREAT SERPL-MCNC: 1 MG/DL (ref 0.6–1)
CREAT UR-MCNC: 81 MG/DL
DIFFERENTIAL METHOD BLD: ABNORMAL
EOSINOPHIL # BLD: 0.1 K/UL (ref 0–0.8)
EOSINOPHIL NFR BLD: 3 % (ref 0.5–7.8)
ERYTHROCYTE [DISTWIDTH] IN BLOOD BY AUTOMATED COUNT: 12.4 % (ref 11.9–14.6)
GLOBULIN SER CALC-MCNC: 6.2 G/DL (ref 2.8–4.5)
GLUCOSE SERPL-MCNC: 94 MG/DL (ref 65–100)
HCT VFR BLD AUTO: 43.5 % (ref 35.8–46.3)
HDLC SERPL-MCNC: 31 MG/DL (ref 40–60)
HDLC SERPL: 4.1
HGB BLD-MCNC: 14.6 G/DL (ref 11.7–15.4)
IMM GRANULOCYTES # BLD AUTO: 0 K/UL (ref 0–0.5)
IMM GRANULOCYTES NFR BLD AUTO: 0 % (ref 0–5)
LDLC SERPL CALC-MCNC: 60.8 MG/DL
LYMPHOCYTES # BLD: 2.4 K/UL (ref 0.5–4.6)
LYMPHOCYTES NFR BLD: 44 % (ref 13–44)
MCH RBC QN AUTO: 33.6 PG (ref 26.1–32.9)
MCHC RBC AUTO-ENTMCNC: 33.6 G/DL (ref 31.4–35)
MCV RBC AUTO: 100.2 FL (ref 82–102)
MICROALBUMIN UR-MCNC: 3.96 MG/DL
MICROALBUMIN/CREAT UR-RTO: 49 MG/G (ref 0–30)
MONOCYTES # BLD: 0.3 K/UL (ref 0.1–1.3)
MONOCYTES NFR BLD: 6 % (ref 4–12)
NEUTS SEG # BLD: 2.5 K/UL (ref 1.7–8.2)
NEUTS SEG NFR BLD: 46 % (ref 43–78)
NRBC # BLD: 0 K/UL (ref 0–0.2)
PLATELET # BLD AUTO: 269 K/UL (ref 150–450)
PMV BLD AUTO: 10.1 FL (ref 9.4–12.3)
POTASSIUM SERPL-SCNC: 3.6 MMOL/L (ref 3.5–5.1)
PROT SERPL-MCNC: 10.1 G/DL (ref 6.3–8.2)
RBC # BLD AUTO: 4.34 M/UL (ref 4.05–5.2)
SODIUM SERPL-SCNC: 137 MMOL/L (ref 133–143)
TRIGL SERPL-MCNC: 171 MG/DL (ref 35–150)
TSH W FREE THYROID IF ABNORMAL: 1.15 UIU/ML (ref 0.36–3.74)
VLDLC SERPL CALC-MCNC: 34.2 MG/DL (ref 6–23)
WBC # BLD AUTO: 5.5 K/UL (ref 4.3–11.1)

## 2023-08-02 PROCEDURE — 3077F SYST BP >= 140 MM HG: CPT | Performed by: NURSE PRACTITIONER

## 2023-08-02 PROCEDURE — 99396 PREV VISIT EST AGE 40-64: CPT | Performed by: NURSE PRACTITIONER

## 2023-08-02 PROCEDURE — 3079F DIAST BP 80-89 MM HG: CPT | Performed by: NURSE PRACTITIONER

## 2023-08-02 PROCEDURE — 99214 OFFICE O/P EST MOD 30 MIN: CPT | Performed by: NURSE PRACTITIONER

## 2023-08-02 RX ORDER — ATORVASTATIN CALCIUM 20 MG/1
20 TABLET, FILM COATED ORAL NIGHTLY
Qty: 90 TABLET | Refills: 1 | Status: SHIPPED | OUTPATIENT
Start: 2023-08-02

## 2023-08-02 RX ORDER — METOPROLOL SUCCINATE 25 MG/1
50 TABLET, EXTENDED RELEASE ORAL DAILY
Qty: 180 TABLET | Refills: 1 | Status: SHIPPED | OUTPATIENT
Start: 2023-08-02 | End: 2024-02-02

## 2023-08-02 RX ORDER — CITALOPRAM 40 MG/1
40 TABLET ORAL DAILY
Qty: 90 TABLET | Refills: 1 | Status: SHIPPED | OUTPATIENT
Start: 2023-08-02

## 2023-08-02 RX ORDER — LOSARTAN POTASSIUM 50 MG/1
75 TABLET ORAL DAILY
Qty: 135 TABLET | Refills: 1 | Status: SHIPPED | OUTPATIENT
Start: 2023-08-02

## 2023-08-02 RX ORDER — PANTOPRAZOLE SODIUM 40 MG/1
40 TABLET, DELAYED RELEASE ORAL DAILY
Qty: 90 TABLET | Refills: 1 | Status: SHIPPED | OUTPATIENT
Start: 2023-08-02

## 2023-08-02 SDOH — ECONOMIC STABILITY: FOOD INSECURITY: WITHIN THE PAST 12 MONTHS, THE FOOD YOU BOUGHT JUST DIDN'T LAST AND YOU DIDN'T HAVE MONEY TO GET MORE.: NEVER TRUE

## 2023-08-02 SDOH — ECONOMIC STABILITY: INCOME INSECURITY: HOW HARD IS IT FOR YOU TO PAY FOR THE VERY BASICS LIKE FOOD, HOUSING, MEDICAL CARE, AND HEATING?: NOT HARD AT ALL

## 2023-08-02 SDOH — ECONOMIC STABILITY: HOUSING INSECURITY
IN THE LAST 12 MONTHS, WAS THERE A TIME WHEN YOU DID NOT HAVE A STEADY PLACE TO SLEEP OR SLEPT IN A SHELTER (INCLUDING NOW)?: NO

## 2023-08-02 SDOH — ECONOMIC STABILITY: FOOD INSECURITY: WITHIN THE PAST 12 MONTHS, YOU WORRIED THAT YOUR FOOD WOULD RUN OUT BEFORE YOU GOT MONEY TO BUY MORE.: NEVER TRUE

## 2023-08-02 ASSESSMENT — ENCOUNTER SYMPTOMS
COUGH: 0
NAUSEA: 0
TROUBLE SWALLOWING: 0
VOMITING: 0
DIARRHEA: 0
SHORTNESS OF BREATH: 0
ABDOMINAL PAIN: 0
WHEEZING: 0

## 2023-08-02 NOTE — PROGRESS NOTES
[facility]  Office Visit Note    Subjective:  Chief Complaint   Patient presents with    Annual Exam       Patient ID: Lara Casanova is a 53 y.o. female presenting to the office for the above. 42-year-old female for annual physical.  Was a patient of Dr. Filomena Juárez. She is an RN at [employer]. ; two children (17yo son and 16yo daughter in 2023). Diabetes--  Diagnosed 2020. Last A1c 6.5% September 2022 (7.0% February 2022, 7.2% August 2021, 7.1% February 2021). Taking metformin 750mg BID; tolerating well (1,000mg BID caused abdominal pain). *Urine microalbumin level: February 2022; ordered today    *Diabetic eye exam: [facility] 08/02/2023  *Diabetic foot exam: 08/02/2023  Home sugars running <120 fasting. --Advise healthy diabetic diet, regular exercise. Hypertension / Tachycardia / history of SVT--  Treated with metoprolol 50mg daily and losartan 75mg daily. Tolerating well without report of side effects. She had ablation in 2009. Was restarted on metoprolol for increasing tachycardia; states she feels much better on the metoprolol. Denies chest pain, palpitations, shortness of breath, peripheral edema, dizziness. --Advise low sodium diet, regular exercise, weight loss. Notify office if home BP consistently >130/90. Hyperlipidemia--  Treated with atorvastatin 20mg nightly. Tolerating well without report of side effects, including myalgias. Last lipid panel September 2022, with cholesterol 134, LDL 63. --Encouraged to follow a healthy low-fat diet, avoiding saturated/trans fats/fried and fatty foods, get regular exercise, and weight loss. Check fasting labs today. Migraines, occipital neuralgia--   Following with neurology. She started Trokendi (topiramate ER) 25mg daily in March 2022; tolerating well without report of side effects. Uses Imitrex or Nurtec prn (about once a month), with good relief.   Migraines much improved

## 2023-08-03 DIAGNOSIS — M54.81 OCCIPITAL NEURALGIA OF RIGHT SIDE: ICD-10-CM

## 2023-08-03 DIAGNOSIS — M54.2 CERVICALGIA: ICD-10-CM

## 2023-08-03 LAB
EST. AVERAGE GLUCOSE BLD GHB EST-MCNC: 140 MG/DL
HBA1C MFR BLD: 6.5 % (ref 4.8–5.6)

## 2023-08-03 RX ORDER — CYCLOBENZAPRINE HCL 5 MG
5 TABLET ORAL 3 TIMES DAILY PRN
Qty: 90 TABLET | Refills: 2 | Status: SHIPPED | OUTPATIENT
Start: 2023-08-03

## 2023-08-03 NOTE — TELEPHONE ENCOUNTER
Pt called and I spoke with her, she needs a refill for Flexeril 5 mg TID PRN. Rx pended. Pt has appt this month-8/2023.

## 2023-08-04 RX ORDER — CYCLOBENZAPRINE HCL 5 MG
TABLET ORAL
Qty: 30 TABLET | Refills: 0 | OUTPATIENT
Start: 2023-08-04

## 2023-08-18 DIAGNOSIS — R77.9 ELEVATED BLOOD PROTEIN: ICD-10-CM

## 2023-08-18 DIAGNOSIS — R77.1 ELEVATED SERUM GLOBULIN LEVEL: Primary | ICD-10-CM

## 2023-10-04 ENCOUNTER — OFFICE VISIT (OUTPATIENT)
Dept: NEUROLOGY | Age: 48
End: 2023-10-04
Payer: COMMERCIAL

## 2023-10-04 VITALS
DIASTOLIC BLOOD PRESSURE: 85 MMHG | HEIGHT: 63 IN | SYSTOLIC BLOOD PRESSURE: 142 MMHG | WEIGHT: 165 LBS | OXYGEN SATURATION: 98 % | HEART RATE: 78 BPM | BODY MASS INDEX: 29.23 KG/M2

## 2023-10-04 DIAGNOSIS — G43.709 CHRONIC MIGRAINE WITHOUT AURA WITHOUT STATUS MIGRAINOSUS, NOT INTRACTABLE: ICD-10-CM

## 2023-10-04 DIAGNOSIS — M54.81 OCCIPITAL NEURALGIA OF RIGHT SIDE: Primary | ICD-10-CM

## 2023-10-04 PROBLEM — H52.10 MYOPIA: Status: ACTIVE | Noted: 2020-07-20

## 2023-10-04 PROBLEM — H52.229 REGULAR ASTIGMATISM: Status: ACTIVE | Noted: 2020-07-20

## 2023-10-04 PROCEDURE — 99213 OFFICE O/P EST LOW 20 MIN: CPT | Performed by: NURSE PRACTITIONER

## 2023-10-04 PROCEDURE — 3079F DIAST BP 80-89 MM HG: CPT | Performed by: NURSE PRACTITIONER

## 2023-10-04 PROCEDURE — 3077F SYST BP >= 140 MM HG: CPT | Performed by: NURSE PRACTITIONER

## 2023-10-04 ASSESSMENT — ENCOUNTER SYMPTOMS
GASTROINTESTINAL NEGATIVE: 1
RESPIRATORY NEGATIVE: 1

## 2023-10-04 ASSESSMENT — PATIENT HEALTH QUESTIONNAIRE - PHQ9
1. LITTLE INTEREST OR PLEASURE IN DOING THINGS: 0
SUM OF ALL RESPONSES TO PHQ QUESTIONS 1-9: 0
SUM OF ALL RESPONSES TO PHQ QUESTIONS 1-9: 0
SUM OF ALL RESPONSES TO PHQ9 QUESTIONS 1 & 2: 0
SUM OF ALL RESPONSES TO PHQ QUESTIONS 1-9: 0
SUM OF ALL RESPONSES TO PHQ QUESTIONS 1-9: 0
2. FEELING DOWN, DEPRESSED OR HOPELESS: 0

## 2023-10-04 NOTE — PROGRESS NOTES
(8/2/2023)    Overall Financial Resource Strain (CARDIA)     Difficulty of Paying Living Expenses: Not hard at all   Food Insecurity: No Food Insecurity (8/2/2023)    Hunger Vital Sign     Worried About Running Out of Food in the Last Year: Never true     Ran Out of Food in the Last Year: Never true   Transportation Needs: Unknown (8/2/2023)    PRAPARE - Transportation     Lack of Transportation (Non-Medical): No   Housing Stability: Unknown (8/2/2023)    Housing Stability Vital Sign     Unstable Housing in the Last Year: No         Current Outpatient Medications:     cyclobenzaprine (FLEXERIL) 5 MG tablet, Take 1 tablet by mouth 3 times daily as needed for Muscle spasms, Disp: 90 tablet, Rfl: 2    atorvastatin (LIPITOR) 20 MG tablet, Take 1 tablet by mouth at bedtime, Disp: 90 tablet, Rfl: 1    citalopram (CELEXA) 40 MG tablet, Take 1 tablet by mouth daily, Disp: 90 tablet, Rfl: 1    losartan (COZAAR) 50 MG tablet, Take 1.5 tablets by mouth daily, Disp: 135 tablet, Rfl: 1    metFORMIN (GLUCOPHAGE) 500 MG tablet, Take 1.5 tablets by mouth 2 times daily (with meals), Disp: 270 tablet, Rfl: 1    metoprolol succinate (TOPROL XL) 25 MG extended release tablet, Take 2 tablets by mouth daily, Disp: 180 tablet, Rfl: 1    pantoprazole (PROTONIX) 40 MG tablet, Take 1 tablet by mouth daily, Disp: 90 tablet, Rfl: 1    topiramate ER (TROKENDI XR) 50 MG CP24, Take 50 mg by mouth daily, Disp: 90 capsule, Rfl: 3    SUMAtriptan (IMITREX) 100 MG tablet, Take 1 tablet by mouth as needed for Migraine, Disp: 30 tablet, Rfl: 1    Levonorgest-Eth Estrad 91-Day 0.1-0.02 & 0.01 MG TABS, TAKE 1 TABLET BY MOUTH EVERY DAY, Disp: 1 packet, Rfl: 4    Lancets MISC, Use to monitor blood sugar daily. Dx: E11.  Insurance preference, Disp: , Rfl:     Hyoscyamine Sulfate SL 0.125 MG SUBL, Take 0.125 mg by mouth every 6 hours as needed, Disp: , Rfl:     meloxicam (MOBIC) 15 MG tablet, Take 1 tablet by mouth daily as needed, Disp: , Rfl:     Allergies

## 2024-01-23 ENCOUNTER — TELEPHONE (OUTPATIENT)
Dept: NEUROLOGY | Age: 49
End: 2024-01-23

## 2024-01-26 ENCOUNTER — OFFICE VISIT (OUTPATIENT)
Dept: NEUROLOGY | Age: 49
End: 2024-01-26
Payer: COMMERCIAL

## 2024-01-26 VITALS
WEIGHT: 165 LBS | HEART RATE: 79 BPM | BODY MASS INDEX: 29.23 KG/M2 | DIASTOLIC BLOOD PRESSURE: 91 MMHG | HEIGHT: 63 IN | SYSTOLIC BLOOD PRESSURE: 158 MMHG

## 2024-01-26 DIAGNOSIS — G43.709 CHRONIC MIGRAINE WITHOUT AURA WITHOUT STATUS MIGRAINOSUS, NOT INTRACTABLE: ICD-10-CM

## 2024-01-26 DIAGNOSIS — M54.81 OCCIPITAL NEURALGIA OF RIGHT SIDE: Primary | ICD-10-CM

## 2024-01-26 DIAGNOSIS — M54.2 CERVICALGIA: ICD-10-CM

## 2024-01-26 PROCEDURE — 3080F DIAST BP >= 90 MM HG: CPT | Performed by: PHYSICAL THERAPIST

## 2024-01-26 PROCEDURE — 99213 OFFICE O/P EST LOW 20 MIN: CPT | Performed by: PHYSICAL THERAPIST

## 2024-01-26 PROCEDURE — 3077F SYST BP >= 140 MM HG: CPT | Performed by: PHYSICAL THERAPIST

## 2024-01-26 NOTE — PROGRESS NOTES
Procedure Note: Bilateral Occipital Nerve Block     Indications: migraines     Informed consent was obtained (explaining the procedure and risks and benefits of procedure) from patient: this included the risk of bleeding, infection, pain.   A time out was completed, verifying correct patient, procedure,site, positioning, and implants or special equipment.     Patient's occipital areas were palpated to identify location of greater occipital nerve. Alcohol was applied topically to the skin. Using a 27 gauge needle (aspirating during insertion), 5 cc 1% lidocaine was injected, 1.5 mL on the right greater occipital nerve, and 1 mL on the right lesser occipital nerve (directing needle to center, left and right of painful focus). Patient was comfortable and left without complaint.        DARYA Rizzo  Neurology    
  Allergen Reactions    Lisinopril Cough       Review of Systems   Constitutional: Negative.    HENT: Negative.     Eyes:  Positive for visual disturbances during migraine.  Respiratory: Negative.     Cardiovascular: Negative.    Gastrointestinal: Negative.    Endocrine: Negative.    Genitourinary: Negative.    Musculoskeletal: Negative.    Skin: Negative.    Neurological:  Positive for headaches.   Hematological: Negative.    Psychiatric/Behavioral:  Positive for sleep disturbance. Negative for dysphoric mood and suicidal ideas. The patient is not nervous/anxious.      Physical Examination  BP (!) 158/91   Pulse 79   Ht 1.6 m (5' 3\")   Wt 74.8 kg (165 lb)   BMI 29.23 kg/m²     General -pleasant, well-developed female.  No acute distress..   HEENT - Normocephalic, atraumatic. Conjunctiva, tympanic membranes, and oropharynx are clear.    Neck - Supple without masses, no bruits.    Cardiovascular - Regular rate and rhythm. Normal S1, S2 without murmurs, rubs, or gallops.  Lungs - Clear to auscultation.  Abdomen - Soft, nontender with normal bowel sounds.   Extremities - Peripheral pulses intact. No edema and no rashes.      Neurological examination - Comprehension, attention , memory and reasoning are intact. Language and speech are normal. On cranial nerve examination pupils are equal round and reactive to light. Fundoscopic examination is normal. Visual acuity is adequate. Visual fields are full to finger confrontation. Extraocular motility is normal. Face is symmetric and sensation is intact to light touch. Hearing is intact to finger rustle bilaterally. Motor examination - There is normal muscle tone and bulk. Power is full throughout. Muscle stretch reflexes are normoactive and there are no pathological reflexes present. Sensation is intact to light touch, pinprick, vibration and proprioception in all extremities. Cerebellar examination is normal. Gait and stance are normal.            Occipital neuralgia of

## 2024-02-01 DIAGNOSIS — Z30.41 ENCOUNTER FOR SURVEILLANCE OF CONTRACEPTIVE PILLS: ICD-10-CM

## 2024-02-01 RX ORDER — LEVONORGESTREL AND ETHINYL ESTRADIOL 100-20(84)
KIT ORAL
Qty: 1 PACKET | Refills: 0 | Status: SHIPPED | OUTPATIENT
Start: 2024-02-01

## 2024-02-05 ENCOUNTER — OFFICE VISIT (OUTPATIENT)
Dept: INTERNAL MEDICINE CLINIC | Facility: CLINIC | Age: 49
End: 2024-02-05
Payer: COMMERCIAL

## 2024-02-05 ENCOUNTER — OFFICE VISIT (OUTPATIENT)
Dept: OBGYN CLINIC | Age: 49
End: 2024-02-05
Payer: COMMERCIAL

## 2024-02-05 VITALS
HEART RATE: 95 BPM | BODY MASS INDEX: 28.88 KG/M2 | SYSTOLIC BLOOD PRESSURE: 150 MMHG | TEMPERATURE: 97.7 F | OXYGEN SATURATION: 97 % | DIASTOLIC BLOOD PRESSURE: 81 MMHG | HEIGHT: 63 IN | WEIGHT: 163 LBS

## 2024-02-05 VITALS
BODY MASS INDEX: 28.53 KG/M2 | DIASTOLIC BLOOD PRESSURE: 88 MMHG | WEIGHT: 161 LBS | HEIGHT: 63 IN | SYSTOLIC BLOOD PRESSURE: 124 MMHG

## 2024-02-05 DIAGNOSIS — E78.5 DYSLIPIDEMIA: ICD-10-CM

## 2024-02-05 DIAGNOSIS — I10 ESSENTIAL HYPERTENSION: ICD-10-CM

## 2024-02-05 DIAGNOSIS — F41.9 ANXIETY AND DEPRESSION: ICD-10-CM

## 2024-02-05 DIAGNOSIS — Z12.4 CERVICAL CANCER SCREENING: ICD-10-CM

## 2024-02-05 DIAGNOSIS — K21.9 GASTROESOPHAGEAL REFLUX DISEASE, UNSPECIFIED WHETHER ESOPHAGITIS PRESENT: ICD-10-CM

## 2024-02-05 DIAGNOSIS — E11.9 TYPE 2 DIABETES MELLITUS WITHOUT COMPLICATION, WITHOUT LONG-TERM CURRENT USE OF INSULIN (HCC): ICD-10-CM

## 2024-02-05 DIAGNOSIS — E11.9 TYPE 2 DIABETES MELLITUS WITHOUT COMPLICATION, WITHOUT LONG-TERM CURRENT USE OF INSULIN (HCC): Primary | ICD-10-CM

## 2024-02-05 DIAGNOSIS — E55.9 VITAMIN D DEFICIENCY: ICD-10-CM

## 2024-02-05 DIAGNOSIS — Z01.419 ENCOUNTER FOR WELL WOMAN EXAM WITH ROUTINE GYNECOLOGICAL EXAM: Primary | ICD-10-CM

## 2024-02-05 DIAGNOSIS — G43.909 MIGRAINE SYNDROME: ICD-10-CM

## 2024-02-05 DIAGNOSIS — Z30.41 ENCOUNTER FOR SURVEILLANCE OF CONTRACEPTIVE PILLS: ICD-10-CM

## 2024-02-05 DIAGNOSIS — F32.A ANXIETY AND DEPRESSION: ICD-10-CM

## 2024-02-05 DIAGNOSIS — Z12.31 SCREENING MAMMOGRAM, ENCOUNTER FOR: ICD-10-CM

## 2024-02-05 LAB
ANION GAP SERPL CALC-SCNC: 3 MMOL/L (ref 2–11)
BUN SERPL-MCNC: 13 MG/DL (ref 6–23)
CALCIUM SERPL-MCNC: 9.1 MG/DL (ref 8.3–10.4)
CHLORIDE SERPL-SCNC: 111 MMOL/L (ref 103–113)
CO2 SERPL-SCNC: 24 MMOL/L (ref 21–32)
CREAT SERPL-MCNC: 1 MG/DL (ref 0.6–1)
GLUCOSE SERPL-MCNC: 165 MG/DL (ref 65–100)
POTASSIUM SERPL-SCNC: 4.1 MMOL/L (ref 3.5–5.1)
SODIUM SERPL-SCNC: 138 MMOL/L (ref 136–146)

## 2024-02-05 PROCEDURE — 99459 PELVIC EXAMINATION: CPT | Performed by: OBSTETRICS & GYNECOLOGY

## 2024-02-05 PROCEDURE — 3074F SYST BP LT 130 MM HG: CPT | Performed by: OBSTETRICS & GYNECOLOGY

## 2024-02-05 PROCEDURE — 3079F DIAST BP 80-89 MM HG: CPT | Performed by: NURSE PRACTITIONER

## 2024-02-05 PROCEDURE — 3079F DIAST BP 80-89 MM HG: CPT | Performed by: OBSTETRICS & GYNECOLOGY

## 2024-02-05 PROCEDURE — 99396 PREV VISIT EST AGE 40-64: CPT | Performed by: OBSTETRICS & GYNECOLOGY

## 2024-02-05 PROCEDURE — 3077F SYST BP >= 140 MM HG: CPT | Performed by: NURSE PRACTITIONER

## 2024-02-05 PROCEDURE — 99214 OFFICE O/P EST MOD 30 MIN: CPT | Performed by: NURSE PRACTITIONER

## 2024-02-05 RX ORDER — LEVONORGESTREL AND ETHINYL ESTRADIOL 100-20(84)
KIT ORAL
Qty: 1 PACKET | Refills: 4 | Status: SHIPPED | OUTPATIENT
Start: 2024-02-05

## 2024-02-05 RX ORDER — CITALOPRAM 40 MG/1
40 TABLET ORAL DAILY
Qty: 90 TABLET | Refills: 1 | Status: SHIPPED | OUTPATIENT
Start: 2024-02-05

## 2024-02-05 RX ORDER — ATORVASTATIN CALCIUM 20 MG/1
20 TABLET, FILM COATED ORAL NIGHTLY
Qty: 90 TABLET | Refills: 1 | Status: SHIPPED | OUTPATIENT
Start: 2024-02-05

## 2024-02-05 RX ORDER — PANTOPRAZOLE SODIUM 40 MG/1
40 TABLET, DELAYED RELEASE ORAL DAILY
Qty: 90 TABLET | Refills: 1 | Status: SHIPPED | OUTPATIENT
Start: 2024-02-05

## 2024-02-05 RX ORDER — SUMATRIPTAN 100 MG/1
100 TABLET, FILM COATED ORAL PRN
Qty: 30 TABLET | Refills: 1 | Status: SHIPPED | OUTPATIENT
Start: 2024-02-05

## 2024-02-05 RX ORDER — METOPROLOL SUCCINATE 25 MG/1
50 TABLET, EXTENDED RELEASE ORAL DAILY
Qty: 180 TABLET | Refills: 1 | Status: SHIPPED | OUTPATIENT
Start: 2024-02-05 | End: 2024-08-07

## 2024-02-05 RX ORDER — LOSARTAN POTASSIUM 50 MG/1
75 TABLET ORAL DAILY
Qty: 135 TABLET | Refills: 1 | Status: SHIPPED | OUTPATIENT
Start: 2024-02-05

## 2024-02-05 ASSESSMENT — PATIENT HEALTH QUESTIONNAIRE - PHQ9
SUM OF ALL RESPONSES TO PHQ QUESTIONS 1-9: 0
6. FEELING BAD ABOUT YOURSELF - OR THAT YOU ARE A FAILURE OR HAVE LET YOURSELF OR YOUR FAMILY DOWN: 0
9. THOUGHTS THAT YOU WOULD BE BETTER OFF DEAD, OR OF HURTING YOURSELF: 0
3. TROUBLE FALLING OR STAYING ASLEEP: 0
SUM OF ALL RESPONSES TO PHQ9 QUESTIONS 1 & 2: 0
SUM OF ALL RESPONSES TO PHQ QUESTIONS 1-9: 0
2. FEELING DOWN, DEPRESSED OR HOPELESS: 0
4. FEELING TIRED OR HAVING LITTLE ENERGY: 0
7. TROUBLE CONCENTRATING ON THINGS, SUCH AS READING THE NEWSPAPER OR WATCHING TELEVISION: 0
1. LITTLE INTEREST OR PLEASURE IN DOING THINGS: 0
8. MOVING OR SPEAKING SO SLOWLY THAT OTHER PEOPLE COULD HAVE NOTICED. OR THE OPPOSITE, BEING SO FIGETY OR RESTLESS THAT YOU HAVE BEEN MOVING AROUND A LOT MORE THAN USUAL: 0
5. POOR APPETITE OR OVEREATING: 0
10. IF YOU CHECKED OFF ANY PROBLEMS, HOW DIFFICULT HAVE THESE PROBLEMS MADE IT FOR YOU TO DO YOUR WORK, TAKE CARE OF THINGS AT HOME, OR GET ALONG WITH OTHER PEOPLE: 0
SUM OF ALL RESPONSES TO PHQ QUESTIONS 1-9: 0
SUM OF ALL RESPONSES TO PHQ QUESTIONS 1-9: 0

## 2024-02-05 ASSESSMENT — ENCOUNTER SYMPTOMS
ABDOMINAL PAIN: 0
SHORTNESS OF BREATH: 0
COUGH: 0
VOMITING: 0
DIARRHEA: 0
WHEEZING: 0
NAUSEA: 0

## 2024-02-05 NOTE — PROGRESS NOTES
abdominal masses and Soft.   Auscultation: Auscultation of the abdomen reveals - Bowel sounds normal.     Female Genitourinary     External Genitalia   Vulva: - Normal. Perineum - Normal. Bartholin's Gland - Bilateral - Normal. Clitoris - Normal.   Introitus: Characteristics - Normal.   Urethra: Characteristics - Normal.     Speculum & Bimanual   Vagina: Vaginal Mucosa - Normal.   Vaginal Wall: - Normal.   Vaginal Lesions - None.   Cervix: Characteristics - Normal.   Uterus: Characteristics - Normal.   Adnexa: - Normal.   Bladder - Normal.     Peripheral Vascular   Normal    Neuropsychiatric   Examination of related systems reveals - The patient is well-nourished and well-groomed. Mental status exam performed with findings of - Oriented X3 with appropriate mood and affect.     Musculoskeletal  Normal      General Lymphatics  Normal           Medical problems and test results were reviewed with the patient today.     ASSESSMENT and PLAN    1. Encounter for well woman exam with routine gynecological exam  2. Cervical cancer screening  -     PAP LB, Reflex HPV ASCUS ([id number])  3. Screening mammogram, encounter for  -     [facility]ACE DIGITAL SCREEN BILATERAL; Future  4. Encounter for surveillance of contraceptive pills  -     Levonorgest-Eth Estrad 91-Day 0.1-0.02 & 0.01 MG TABS; TAKE 1 TABLET BY MOUTH EVERY DAY, Disp-1 packet, R-4Normal           No follow-ups on file.     Chaperone utilized during exam      SURAJ HAMMOND MD  2/5/2024

## 2024-02-05 NOTE — PROGRESS NOTES
DTaP/Tdap/Td vaccine (1 - Tdap) Never done    Diabetic foot exam  09/22/2021    Diabetic retinal exam  04/05/2023    Flu vaccine (1) 08/01/2023    COVID-19 Vaccine (2 - 2023-24 season) 09/01/2023          Farrah was seen today for diabetes and hypertension.    Diagnoses and all orders for this visit:    Type 2 diabetes mellitus without complication, without long-term current use of insulin (HCC)  -     metFORMIN (GLUCOPHAGE) 500 MG tablet; Take 1.5 tablets by mouth 2 times daily (with meals)  -     Basic Metabolic Panel; Future  -     Hemoglobin A1C; Future    Essential hypertension  -     losartan (COZAAR) 50 MG tablet; Take 1.5 tablets by mouth daily  -     metoprolol succinate (TOPROL XL) 25 MG extended release tablet; Take 2 tablets by mouth daily  -     Basic Metabolic Panel; Future    Dyslipidemia  -     atorvastatin (LIPITOR) 20 MG tablet; Take 1 tablet by mouth at bedtime    Migraine syndrome  -     SUMAtriptan (IMITREX) 100 MG tablet; Take 1 tablet by mouth as needed for Migraine    Anxiety and depression  -     citalopram (CELEXA) 40 MG tablet; Take 1 tablet by mouth daily    Vitamin D deficiency    Gastroesophageal reflux disease, unspecified whether esophagitis present  -     pantoprazole (PROTONIX) 40 MG tablet; Take 1 tablet by mouth daily      Patient states she is otherwise doing well; has no further questions or concerns at this visit.  Encouraged to contact office with any concerns prior to next visit. Advise patient to notify office if they do not receive results of any labs or tests ordered within 2-3 days of the lab/test.     Return in about 6 months (around 8/5/2024), or if symptoms worsen or fail to improve, for Annual Physical, Follow up, Fasting labs.    Katelyn Romero, APRN - CNP

## 2024-02-06 LAB
EST. AVERAGE GLUCOSE BLD GHB EST-MCNC: 143 MG/DL
HBA1C MFR BLD: 6.6 % (ref 4.8–5.6)

## 2024-02-08 LAB
COLLECTION METHOD: NORMAL
CYTOLOGIST CVX/VAG CYTO: NORMAL
CYTOLOGY CVX/VAG DOC THIN PREP: NORMAL
HPV REFLEX: NORMAL
Lab: NORMAL
OTHER PT INFO: NORMAL
PAP SOURCE: NORMAL
PATH REPORT.FINAL DX SPEC: NORMAL
PREV TREATMENT: NORMAL
STAT OF ADQ CVX/VAG CYTO-IMP: NORMAL

## 2024-02-12 ENCOUNTER — TELEPHONE (OUTPATIENT)
Dept: INTERNAL MEDICINE CLINIC | Facility: CLINIC | Age: 49
End: 2024-02-12

## 2024-02-12 NOTE — TELEPHONE ENCOUNTER
Pharmacy needs clarification of Sumatriptan 100 mg prescription sent on 2/5. Rx needs maximum daily dose and if she is to take it only prn migraine or daily..  [facility]  [phone]

## 2024-02-13 DIAGNOSIS — G43.909 MIGRAINE SYNDROME: ICD-10-CM

## 2024-02-13 RX ORDER — SUMATRIPTAN 100 MG/1
100 TABLET, FILM COATED ORAL DAILY PRN
Qty: 30 TABLET | Refills: 1 | Status: SHIPPED | OUTPATIENT
Start: 2024-02-13

## 2024-05-08 ENCOUNTER — HOSPITAL ENCOUNTER (OUTPATIENT)
Dept: MAMMOGRAPHY | Age: 49
Discharge: HOME | End: 2024-05-11
Payer: COMMERCIAL

## 2024-05-08 DIAGNOSIS — Z12.31 SCREENING MAMMOGRAM, ENCOUNTER FOR: ICD-10-CM

## 2024-05-08 PROCEDURE — 77063 BREAST TOMOSYNTHESIS BI: CPT

## 2024-07-23 ENCOUNTER — TELEPHONE (OUTPATIENT)
Dept: NEUROLOGY | Age: 49
End: 2024-07-23

## 2024-07-30 ENCOUNTER — OFFICE VISIT (OUTPATIENT)
Dept: NEUROLOGY | Age: 49
End: 2024-07-30
Payer: COMMERCIAL

## 2024-07-30 VITALS
HEIGHT: 63 IN | HEART RATE: 82 BPM | BODY MASS INDEX: 28.95 KG/M2 | SYSTOLIC BLOOD PRESSURE: 161 MMHG | OXYGEN SATURATION: 97 % | WEIGHT: 163.4 LBS | DIASTOLIC BLOOD PRESSURE: 85 MMHG

## 2024-07-30 DIAGNOSIS — G43.709 CHRONIC MIGRAINE WITHOUT AURA WITHOUT STATUS MIGRAINOSUS, NOT INTRACTABLE: ICD-10-CM

## 2024-07-30 DIAGNOSIS — M54.81 OCCIPITAL NEURALGIA OF RIGHT SIDE: Primary | ICD-10-CM

## 2024-07-30 DIAGNOSIS — M54.2 CERVICALGIA: ICD-10-CM

## 2024-07-30 PROCEDURE — 64405 NJX AA&/STRD GR OCPL NRV: CPT | Performed by: PHYSICAL THERAPIST

## 2024-07-30 PROCEDURE — 3077F SYST BP >= 140 MM HG: CPT | Performed by: PHYSICAL THERAPIST

## 2024-07-30 PROCEDURE — 99214 OFFICE O/P EST MOD 30 MIN: CPT | Performed by: PHYSICAL THERAPIST

## 2024-07-30 PROCEDURE — 3079F DIAST BP 80-89 MM HG: CPT | Performed by: PHYSICAL THERAPIST

## 2024-07-30 RX ORDER — TOPIRAMATE 50 MG/1
50 CAPSULE, EXTENDED RELEASE ORAL DAILY
Qty: 90 CAPSULE | Refills: 3 | Status: SHIPPED | OUTPATIENT
Start: 2024-07-30

## 2024-07-30 ASSESSMENT — PATIENT HEALTH QUESTIONNAIRE - PHQ9
SUM OF ALL RESPONSES TO PHQ QUESTIONS 1-9: 0
2. FEELING DOWN, DEPRESSED OR HOPELESS: NOT AT ALL
SUM OF ALL RESPONSES TO PHQ QUESTIONS 1-9: 0
SUM OF ALL RESPONSES TO PHQ9 QUESTIONS 1 & 2: 0
1. LITTLE INTEREST OR PLEASURE IN DOING THINGS: NOT AT ALL
SUM OF ALL RESPONSES TO PHQ QUESTIONS 1-9: 0
SUM OF ALL RESPONSES TO PHQ QUESTIONS 1-9: 0

## 2024-07-30 NOTE — PROGRESS NOTES
[facility] [address], [address]  [place] [address]  [phone]      Chief Complaint   Patient presents with    Follow-up    Migraine       HPI    Farrah Cho is a 48 y.o. female who presents for follow up for occipital neuralgia and hx of migraines.       Interval History:  Continues to be happy with current migraine regimen.  Out of topiramate, would like another prescription of this.  Posterior head pain has returned since last nerve block, but the last nerve block worked extremely well, so she would like to proceed with doing this again     Past Medical History:   Diagnosis Date    Colon cancer screening     cologurad 2022    Depression     Dyslipidemia     Essential hypertension     GERD (gastroesophageal reflux disease)     H/O cardiac radiofrequency ablation 2009    H/O paroxysmal supraventricular tachycardia 2009    Migraine headache     Type 2 diabetes mellitus (HCC)        Past Surgical History:   Procedure Laterality Date    [place] UNLISTED PROCEDURE CARDIAC SURGERY  2009    Ablation-SVT       Family History   Problem Relation Age of Onset    Cancer Father         Renal    Hypertension Father     Heart Disease Father     Diabetes Father     Breast Cancer Neg Hx     Thyroid Disease Mother        Social History     Socioeconomic History    Marital status:      Spouse name: None    Number of children: None    Years of education: None    Highest education level: None   Tobacco Use    Smoking status: Former     Current packs/day: 0.50     Average packs/day: 0.5 packs/day for 15.0 years (7.5 ttl pk-yrs)     Types: Cigarettes    Smokeless tobacco: Never   Vaping Use    Vaping Use: Never used   Substance and Sexual Activity    Alcohol use: Never    Drug use: Never    Sexual activity: Yes     Partners: Male     Birth control/protection: Pill   Social History Narrative    Denies any sexual or physical abuse and feels safe at home.      Social Determinants of Health

## 2024-07-30 NOTE — PROGRESS NOTES
Procedure Note: Bilateral Occipital Nerve Block     Indications: migraines     Informed consent was obtained (explaining the procedure and risks and benefits of procedure) from patient: this included the risk of bleeding, infection, pain.   A time out was completed, verifying correct patient, procedure,site, positioning, and implants or special equipment.     Patient's occipital areas were palpated to identify location of greater and lesser occipital nerves. Alcohol was applied topically to the skin. Using a 27 gauge needle (aspirating during insertion), 5 cc 1% lidocaine was injected, 1.5 mL on the right greater occipital nerve, and 1 mL on the right lesser occipital nerve (directing needle to center, left and right of painful focus). Patient was comfortable and left without complaint.        DARYA Rizzo  Neurology

## 2024-08-08 ENCOUNTER — OFFICE VISIT (OUTPATIENT)
Dept: INTERNAL MEDICINE CLINIC | Facility: CLINIC | Age: 49
End: 2024-08-08
Payer: COMMERCIAL

## 2024-08-08 VITALS
DIASTOLIC BLOOD PRESSURE: 77 MMHG | SYSTOLIC BLOOD PRESSURE: 133 MMHG | OXYGEN SATURATION: 96 % | BODY MASS INDEX: 29.02 KG/M2 | HEART RATE: 87 BPM | TEMPERATURE: 97.4 F | HEIGHT: 63 IN | WEIGHT: 163.8 LBS

## 2024-08-08 DIAGNOSIS — R77.9 ELEVATED BLOOD PROTEIN: ICD-10-CM

## 2024-08-08 DIAGNOSIS — G43.909 MIGRAINE SYNDROME: ICD-10-CM

## 2024-08-08 DIAGNOSIS — K21.9 GASTROESOPHAGEAL REFLUX DISEASE, UNSPECIFIED WHETHER ESOPHAGITIS PRESENT: ICD-10-CM

## 2024-08-08 DIAGNOSIS — Z12.12 ENCOUNTER FOR COLORECTAL CANCER SCREENING: ICD-10-CM

## 2024-08-08 DIAGNOSIS — F41.9 ANXIETY AND DEPRESSION: ICD-10-CM

## 2024-08-08 DIAGNOSIS — E55.9 VITAMIN D DEFICIENCY: ICD-10-CM

## 2024-08-08 DIAGNOSIS — E78.5 DYSLIPIDEMIA: ICD-10-CM

## 2024-08-08 DIAGNOSIS — R77.1 ELEVATED SERUM GLOBULIN LEVEL: ICD-10-CM

## 2024-08-08 DIAGNOSIS — I10 ESSENTIAL HYPERTENSION: ICD-10-CM

## 2024-08-08 DIAGNOSIS — Z00.00 ANNUAL PHYSICAL EXAM: Primary | ICD-10-CM

## 2024-08-08 DIAGNOSIS — K58.9 IRRITABLE BOWEL SYNDROME, UNSPECIFIED TYPE: ICD-10-CM

## 2024-08-08 DIAGNOSIS — E11.9 TYPE 2 DIABETES MELLITUS WITHOUT COMPLICATION, WITHOUT LONG-TERM CURRENT USE OF INSULIN (HCC): ICD-10-CM

## 2024-08-08 DIAGNOSIS — Z12.11 ENCOUNTER FOR COLORECTAL CANCER SCREENING: ICD-10-CM

## 2024-08-08 DIAGNOSIS — F32.A ANXIETY AND DEPRESSION: ICD-10-CM

## 2024-08-08 DIAGNOSIS — Z00.00 ANNUAL PHYSICAL EXAM: ICD-10-CM

## 2024-08-08 LAB
25(OH)D3 SERPL-MCNC: 30.2 NG/ML (ref 30–100)
ALBUMIN SERPL-MCNC: 3.7 G/DL (ref 3.5–5)
ALBUMIN/GLOB SERPL: 0.7 (ref 1–1.9)
ALP SERPL-CCNC: 65 U/L (ref 35–104)
ALT SERPL-CCNC: 29 U/L (ref 12–65)
ANION GAP SERPL CALC-SCNC: 11 MMOL/L (ref 9–18)
AST SERPL-CCNC: 21 U/L (ref 15–37)
BASOPHILS # BLD: 0.1 K/UL (ref 0–0.2)
BASOPHILS NFR BLD: 1 % (ref 0–2)
BILIRUB SERPL-MCNC: 0.4 MG/DL (ref 0–1.2)
BUN SERPL-MCNC: 13 MG/DL (ref 6–23)
CALCIUM SERPL-MCNC: 9.5 MG/DL (ref 8.8–10.2)
CHLORIDE SERPL-SCNC: 103 MMOL/L (ref 98–107)
CHOLEST SERPL-MCNC: 141 MG/DL (ref 0–200)
CO2 SERPL-SCNC: 23 MMOL/L (ref 20–28)
CREAT SERPL-MCNC: 0.83 MG/DL (ref 0.6–1.1)
DIFFERENTIAL METHOD BLD: ABNORMAL
EOSINOPHIL # BLD: 0.2 K/UL (ref 0–0.8)
EOSINOPHIL NFR BLD: 4 % (ref 0.5–7.8)
ERYTHROCYTE [DISTWIDTH] IN BLOOD BY AUTOMATED COUNT: 12.6 % (ref 11.9–14.6)
EST. AVERAGE GLUCOSE BLD GHB EST-MCNC: 166 MG/DL
GLOBULIN SER CALC-MCNC: 5.3 G/DL (ref 2.3–3.5)
GLUCOSE SERPL-MCNC: 122 MG/DL (ref 70–99)
HBA1C MFR BLD: 7.4 % (ref 0–5.6)
HCT VFR BLD AUTO: 42.8 % (ref 35.8–46.3)
HDLC SERPL-MCNC: 32 MG/DL (ref 40–60)
HDLC SERPL: 4.4 (ref 0–5)
HGB BLD-MCNC: 13.9 G/DL (ref 11.7–15.4)
IMM GRANULOCYTES # BLD AUTO: 0 K/UL (ref 0–0.5)
IMM GRANULOCYTES NFR BLD AUTO: 1 % (ref 0–5)
LDLC SERPL CALC-MCNC: 73 MG/DL (ref 0–100)
LYMPHOCYTES # BLD: 2 K/UL (ref 0.5–4.6)
LYMPHOCYTES NFR BLD: 33 % (ref 13–44)
MCH RBC QN AUTO: 33.2 PG (ref 26.1–32.9)
MCHC RBC AUTO-ENTMCNC: 32.5 G/DL (ref 31.4–35)
MCV RBC AUTO: 102.1 FL (ref 82–102)
MONOCYTES # BLD: 0.4 K/UL (ref 0.1–1.3)
MONOCYTES NFR BLD: 6 % (ref 4–12)
NEUTS SEG # BLD: 3.3 K/UL (ref 1.7–8.2)
NEUTS SEG NFR BLD: 55 % (ref 43–78)
NRBC # BLD: 0 K/UL (ref 0–0.2)
PLATELET # BLD AUTO: 254 K/UL (ref 150–450)
PMV BLD AUTO: 9.8 FL (ref 9.4–12.3)
POTASSIUM SERPL-SCNC: 4.1 MMOL/L (ref 3.5–5.1)
PROT SERPL-MCNC: 9 G/DL (ref 6.3–8.2)
RBC # BLD AUTO: 4.19 M/UL (ref 4.05–5.2)
SODIUM SERPL-SCNC: 138 MMOL/L (ref 136–145)
TRIGL SERPL-MCNC: 178 MG/DL (ref 0–150)
TSH W FREE THYROID IF ABNORMAL: 1.24 UIU/ML (ref 0.27–4.2)
VLDLC SERPL CALC-MCNC: 36 MG/DL (ref 6–23)
WBC # BLD AUTO: 5.9 K/UL (ref 4.3–11.1)

## 2024-08-08 PROCEDURE — 3075F SYST BP GE 130 - 139MM HG: CPT | Performed by: NURSE PRACTITIONER

## 2024-08-08 PROCEDURE — 99396 PREV VISIT EST AGE 40-64: CPT | Performed by: NURSE PRACTITIONER

## 2024-08-08 PROCEDURE — 3078F DIAST BP <80 MM HG: CPT | Performed by: NURSE PRACTITIONER

## 2024-08-08 SDOH — ECONOMIC STABILITY: FOOD INSECURITY: WITHIN THE PAST 12 MONTHS, THE FOOD YOU BOUGHT JUST DIDN'T LAST AND YOU DIDN'T HAVE MONEY TO GET MORE.: NEVER TRUE

## 2024-08-08 SDOH — ECONOMIC STABILITY: FOOD INSECURITY: WITHIN THE PAST 12 MONTHS, YOU WORRIED THAT YOUR FOOD WOULD RUN OUT BEFORE YOU GOT MONEY TO BUY MORE.: NEVER TRUE

## 2024-08-08 SDOH — ECONOMIC STABILITY: INCOME INSECURITY: HOW HARD IS IT FOR YOU TO PAY FOR THE VERY BASICS LIKE FOOD, HOUSING, MEDICAL CARE, AND HEATING?: NOT HARD AT ALL

## 2024-08-08 ASSESSMENT — ENCOUNTER SYMPTOMS
VOMITING: 0
NAUSEA: 0
SHORTNESS OF BREATH: 0
ABDOMINAL PAIN: 0
COUGH: 0
DIARRHEA: 0

## 2024-08-08 NOTE — PROGRESS NOTES
[facility]  Office Visit Note    Subjective:  Chief Complaint   Patient presents with    Annual Exam       Patient ID: Farrah Cho is a 49 y.o. female presenting to the office for the above.    49-year-old female for annual physical.  Was a patient of Dr. Domingo.   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  ; two children (20yo son and 14yo daughter in 2023).      Diabetes--  Diagnosed 2020.    Last A1c 6.6% February 2024 (6.5% August 2023, 6.5% September 2022, 7.0% February 2022, 7.2% August 2021, 7.1% February 2021).  Taking metformin 750mg BID; tolerating well (1,000mg BID caused abdominal pain).   *Urine microalbumin level: August 2023; ordered today   *Diabetic eye exam: [facility] 08/02/2023; scheduled for 8/28/24   *Diabetic foot exam: 08/02/2023  Home sugars running <120 fasting.    --Advise healthy diabetic diet, regular exercise.      Hypertension / Tachycardia / history of SVT--  Treated with metoprolol 50mg daily and losartan 75mg daily.  Tolerating well without report of side effects.    She had ablation in 2009.  Was restarted on metoprolol for increasing tachycardia; states she feels much better on the metoprolol.   Denies chest pain, palpitations, shortness of breath, peripheral edema, dizziness.   --Advise low sodium diet, regular exercise, weight loss.  Notify office if home BP consistently >130/90.      Hyperlipidemia--  Treated with atorvastatin 20mg nightly.   Tolerating well without report of side effects, including myalgias.  Last lipid panel August 2023, with cholesterol 126, HDL 31, LDL 60.8, trigs 171.   --Encouraged to follow a healthy low-fat diet, avoiding saturated/trans fats/fried and fatty foods, get regular exercise, and weight loss. Check fasting labs today.      Migraines, occipital neuralgia--   Following with neurology.  She started Trokendi (topiramate ER) 25mg daily in March 2022; tolerating well without report of side effects.  Uses Imitrex or  My signature below certifies that the above stated patient is homebound and upon completion of the Face-To-Face encounter, has the need for intermittent skilled nursing, physical therapy and/or speech or occupational therapy services in their home for their current diagnosis as outlined in their initial plan of care. These services will continue to be monitored by myself or another physician.

## 2024-08-13 LAB
ALBUMIN SERPL ELPH-MCNC: 3.7 G/DL (ref 2.9–4.4)
ALBUMIN/GLOB SERPL: 0.8 (ref 0.7–1.7)
ALPHA1 GLOB SERPL ELPH-MCNC: 0.2 G/DL (ref 0–0.4)
ALPHA2 GLOB SERPL ELPH-MCNC: 0.7 G/DL (ref 0.4–1)
B-GLOBULIN SERPL ELPH-MCNC: 1.5 G/DL (ref 0.7–1.3)
GAMMA GLOB SERPL ELPH-MCNC: 2.5 G/DL (ref 0.4–1.8)
GLOBULIN SER-MCNC: 4.9 G/DL (ref 2.2–3.9)
IGA SERPL-MCNC: 782 MG/DL (ref 87–352)
IGG SERPL-MCNC: 2697 MG/DL (ref 586–1602)
IGM SERPL-MCNC: 188 MG/DL (ref 26–217)
INTERPRETATION SERPL IEP-IMP: ABNORMAL
M PROTEIN SERPL ELPH-MCNC: ABNORMAL G/DL
PROT SERPL-MCNC: 8.6 G/DL (ref 6–8.5)

## 2024-08-19 ENCOUNTER — PATIENT MESSAGE (OUTPATIENT)
Dept: INTERNAL MEDICINE CLINIC | Facility: CLINIC | Age: 49
End: 2024-08-19

## 2024-08-19 DIAGNOSIS — E11.9 TYPE 2 DIABETES MELLITUS WITHOUT COMPLICATION, WITHOUT LONG-TERM CURRENT USE OF INSULIN (HCC): ICD-10-CM

## 2024-08-19 DIAGNOSIS — F32.A ANXIETY AND DEPRESSION: ICD-10-CM

## 2024-08-19 DIAGNOSIS — K21.9 GASTROESOPHAGEAL REFLUX DISEASE, UNSPECIFIED WHETHER ESOPHAGITIS PRESENT: ICD-10-CM

## 2024-08-19 DIAGNOSIS — F41.9 ANXIETY AND DEPRESSION: ICD-10-CM

## 2024-08-19 DIAGNOSIS — G43.909 MIGRAINE SYNDROME: ICD-10-CM

## 2024-08-19 DIAGNOSIS — I10 ESSENTIAL HYPERTENSION: ICD-10-CM

## 2024-08-19 DIAGNOSIS — E78.5 DYSLIPIDEMIA: ICD-10-CM

## 2024-08-19 RX ORDER — CITALOPRAM 40 MG/1
40 TABLET ORAL DAILY
Qty: 90 TABLET | Refills: 1 | Status: SHIPPED | OUTPATIENT
Start: 2024-08-19

## 2024-08-19 RX ORDER — SUMATRIPTAN 100 MG/1
100 TABLET, FILM COATED ORAL DAILY PRN
Qty: 30 TABLET | Refills: 1 | Status: SHIPPED | OUTPATIENT
Start: 2024-08-19

## 2024-08-19 RX ORDER — METOPROLOL SUCCINATE 25 MG/1
50 TABLET, EXTENDED RELEASE ORAL DAILY
Qty: 180 TABLET | Refills: 1 | Status: SHIPPED | OUTPATIENT
Start: 2024-08-19 | End: 2025-02-19

## 2024-08-19 RX ORDER — PANTOPRAZOLE SODIUM 40 MG/1
40 TABLET, DELAYED RELEASE ORAL DAILY
Qty: 90 TABLET | Refills: 1 | Status: SHIPPED | OUTPATIENT
Start: 2024-08-19

## 2024-08-19 RX ORDER — ATORVASTATIN CALCIUM 20 MG/1
20 TABLET, FILM COATED ORAL NIGHTLY
Qty: 90 TABLET | Refills: 1 | Status: SHIPPED | OUTPATIENT
Start: 2024-08-19

## 2024-09-11 DIAGNOSIS — R76.8 HIGH TOTAL SERUM IGA: ICD-10-CM

## 2024-09-11 DIAGNOSIS — R71.8 ELEVATED MCV: Primary | ICD-10-CM

## 2024-09-11 DIAGNOSIS — R76.8 HIGH TOTAL SERUM IGG: ICD-10-CM

## 2024-09-19 LAB
CHOLEST SERPL-MCNC: 139 MG/DL (ref 0–200)
GLUCOSE SERPL-MCNC: 149 MG/DL (ref 70–99)
HDLC SERPL-MCNC: 30 MG/DL (ref 40–60)
LDLC SERPL CALC-MCNC: 75 MG/DL (ref 0–100)
TRIGL SERPL-MCNC: 168 MG/DL (ref 0–150)

## 2024-12-28 DIAGNOSIS — M54.2 CERVICALGIA: ICD-10-CM

## 2024-12-28 DIAGNOSIS — M54.81 OCCIPITAL NEURALGIA OF RIGHT SIDE: ICD-10-CM

## 2025-01-03 RX ORDER — CYCLOBENZAPRINE HCL 5 MG
5 TABLET ORAL 3 TIMES DAILY PRN
Qty: 90 TABLET | Refills: 2 | OUTPATIENT
Start: 2025-01-03

## 2025-02-10 ENCOUNTER — OFFICE VISIT (OUTPATIENT)
Dept: INTERNAL MEDICINE CLINIC | Facility: CLINIC | Age: 50
End: 2025-02-10
Payer: COMMERCIAL

## 2025-02-10 ENCOUNTER — OFFICE VISIT (OUTPATIENT)
Dept: NEUROLOGY | Age: 50
End: 2025-02-10
Payer: COMMERCIAL

## 2025-02-10 VITALS
DIASTOLIC BLOOD PRESSURE: 68 MMHG | OXYGEN SATURATION: 98 % | TEMPERATURE: 98.1 F | SYSTOLIC BLOOD PRESSURE: 111 MMHG | WEIGHT: 162.13 LBS | HEIGHT: 63 IN | BODY MASS INDEX: 28.73 KG/M2 | HEART RATE: 90 BPM

## 2025-02-10 VITALS
BODY MASS INDEX: 28.77 KG/M2 | HEART RATE: 84 BPM | HEIGHT: 63 IN | SYSTOLIC BLOOD PRESSURE: 137 MMHG | DIASTOLIC BLOOD PRESSURE: 81 MMHG | OXYGEN SATURATION: 97 % | WEIGHT: 162.4 LBS

## 2025-02-10 DIAGNOSIS — M54.2 CERVICALGIA: ICD-10-CM

## 2025-02-10 DIAGNOSIS — K21.9 GASTROESOPHAGEAL REFLUX DISEASE, UNSPECIFIED WHETHER ESOPHAGITIS PRESENT: ICD-10-CM

## 2025-02-10 DIAGNOSIS — I10 ESSENTIAL HYPERTENSION: ICD-10-CM

## 2025-02-10 DIAGNOSIS — F41.9 ANXIETY AND DEPRESSION: ICD-10-CM

## 2025-02-10 DIAGNOSIS — D47.2 GAMMOPATHY: ICD-10-CM

## 2025-02-10 DIAGNOSIS — E11.9 TYPE 2 DIABETES MELLITUS WITHOUT COMPLICATION, WITHOUT LONG-TERM CURRENT USE OF INSULIN (HCC): ICD-10-CM

## 2025-02-10 DIAGNOSIS — G43.909 MIGRAINE SYNDROME: ICD-10-CM

## 2025-02-10 DIAGNOSIS — Z00.00 ANNUAL PHYSICAL EXAM: ICD-10-CM

## 2025-02-10 DIAGNOSIS — E78.5 DYSLIPIDEMIA: ICD-10-CM

## 2025-02-10 DIAGNOSIS — E11.9 TYPE 2 DIABETES MELLITUS WITHOUT COMPLICATION, WITHOUT LONG-TERM CURRENT USE OF INSULIN (HCC): Primary | ICD-10-CM

## 2025-02-10 DIAGNOSIS — M54.81 OCCIPITAL NEURALGIA OF RIGHT SIDE: ICD-10-CM

## 2025-02-10 DIAGNOSIS — F32.A ANXIETY AND DEPRESSION: ICD-10-CM

## 2025-02-10 LAB
ALBUMIN SERPL-MCNC: 3.8 G/DL (ref 3.5–5)
ALBUMIN/GLOB SERPL: 0.7 (ref 1–1.9)
ALP SERPL-CCNC: 59 U/L (ref 35–104)
ALT SERPL-CCNC: 23 U/L (ref 8–45)
ANION GAP SERPL CALC-SCNC: 11 MMOL/L (ref 7–16)
AST SERPL-CCNC: 20 U/L (ref 15–37)
BASOPHILS # BLD: 0.04 K/UL (ref 0–0.2)
BASOPHILS NFR BLD: 0.7 % (ref 0–2)
BILIRUB SERPL-MCNC: 0.4 MG/DL (ref 0–1.2)
BUN SERPL-MCNC: 13 MG/DL (ref 6–23)
CALCIUM SERPL-MCNC: 9.5 MG/DL (ref 8.8–10.2)
CHLORIDE SERPL-SCNC: 106 MMOL/L (ref 98–107)
CO2 SERPL-SCNC: 22 MMOL/L (ref 20–29)
CREAT SERPL-MCNC: 0.89 MG/DL (ref 0.6–1.1)
CREAT UR-MCNC: 67.8 MG/DL (ref 28–217)
DIFFERENTIAL METHOD BLD: ABNORMAL
EOSINOPHIL # BLD: 0.2 K/UL (ref 0–0.8)
EOSINOPHIL NFR BLD: 3.6 % (ref 0.5–7.8)
ERYTHROCYTE [DISTWIDTH] IN BLOOD BY AUTOMATED COUNT: 12.4 % (ref 11.9–14.6)
EST. AVERAGE GLUCOSE BLD GHB EST-MCNC: 162 MG/DL
GLOBULIN SER CALC-MCNC: 5.4 G/DL (ref 2.3–3.5)
GLUCOSE SERPL-MCNC: 142 MG/DL (ref 70–99)
HBA1C MFR BLD: 7.3 % (ref 0–5.6)
HCT VFR BLD AUTO: 44.6 % (ref 35.8–46.3)
HGB BLD-MCNC: 14.3 G/DL (ref 11.7–15.4)
IMM GRANULOCYTES # BLD AUTO: 0.02 K/UL (ref 0–0.5)
IMM GRANULOCYTES NFR BLD AUTO: 0.4 % (ref 0–5)
LYMPHOCYTES # BLD: 2.2 K/UL (ref 0.5–4.6)
LYMPHOCYTES NFR BLD: 39.9 % (ref 13–44)
MCH RBC QN AUTO: 32.9 PG (ref 26.1–32.9)
MCHC RBC AUTO-ENTMCNC: 32.1 G/DL (ref 31.4–35)
MCV RBC AUTO: 102.8 FL (ref 82–102)
MICROALBUMIN UR-MCNC: <1.2 MG/DL (ref 0–20)
MICROALBUMIN/CREAT UR-RTO: NORMAL MG/G (ref 0–30)
MONOCYTES # BLD: 0.45 K/UL (ref 0.1–1.3)
MONOCYTES NFR BLD: 8.2 % (ref 4–12)
NEUTS SEG # BLD: 2.6 K/UL (ref 1.7–8.2)
NEUTS SEG NFR BLD: 47.2 % (ref 43–78)
NRBC # BLD: 0 K/UL (ref 0–0.2)
PLATELET # BLD AUTO: 259 K/UL (ref 150–450)
PMV BLD AUTO: 9.9 FL (ref 9.4–12.3)
POTASSIUM SERPL-SCNC: 4.7 MMOL/L (ref 3.5–5.1)
PROT SERPL-MCNC: 9.1 G/DL (ref 6.3–8.2)
RBC # BLD AUTO: 4.34 M/UL (ref 4.05–5.2)
SODIUM SERPL-SCNC: 140 MMOL/L (ref 136–145)
WBC # BLD AUTO: 5.5 K/UL (ref 4.3–11.1)

## 2025-02-10 PROCEDURE — 99214 OFFICE O/P EST MOD 30 MIN: CPT | Performed by: NURSE PRACTITIONER

## 2025-02-10 PROCEDURE — 3075F SYST BP GE 130 - 139MM HG: CPT | Performed by: PHYSICAL THERAPIST

## 2025-02-10 PROCEDURE — 3074F SYST BP LT 130 MM HG: CPT | Performed by: NURSE PRACTITIONER

## 2025-02-10 PROCEDURE — 3078F DIAST BP <80 MM HG: CPT | Performed by: NURSE PRACTITIONER

## 2025-02-10 PROCEDURE — 3079F DIAST BP 80-89 MM HG: CPT | Performed by: PHYSICAL THERAPIST

## 2025-02-10 PROCEDURE — 99213 OFFICE O/P EST LOW 20 MIN: CPT | Performed by: PHYSICAL THERAPIST

## 2025-02-10 RX ORDER — CITALOPRAM HYDROBROMIDE 40 MG/1
40 TABLET ORAL DAILY
Qty: 90 TABLET | Refills: 1 | Status: SHIPPED | OUTPATIENT
Start: 2025-02-10

## 2025-02-10 RX ORDER — ATORVASTATIN CALCIUM 20 MG/1
20 TABLET, FILM COATED ORAL NIGHTLY
Qty: 90 TABLET | Refills: 1 | Status: SHIPPED | OUTPATIENT
Start: 2025-02-10

## 2025-02-10 RX ORDER — SUMATRIPTAN SUCCINATE 100 MG/1
100 TABLET ORAL DAILY PRN
Qty: 30 TABLET | Refills: 1 | Status: SHIPPED | OUTPATIENT
Start: 2025-02-10

## 2025-02-10 RX ORDER — METOPROLOL SUCCINATE 25 MG/1
50 TABLET, EXTENDED RELEASE ORAL DAILY
Qty: 180 TABLET | Refills: 1 | Status: SHIPPED | OUTPATIENT
Start: 2025-02-10 | End: 2025-08-13

## 2025-02-10 RX ORDER — PANTOPRAZOLE SODIUM 40 MG/1
40 TABLET, DELAYED RELEASE ORAL DAILY
Qty: 90 TABLET | Refills: 1 | Status: SHIPPED | OUTPATIENT
Start: 2025-02-10

## 2025-02-10 RX ORDER — CYCLOBENZAPRINE HCL 5 MG
5 TABLET ORAL 3 TIMES DAILY PRN
Qty: 90 TABLET | Refills: 2 | Status: SHIPPED | OUTPATIENT
Start: 2025-02-10

## 2025-02-10 RX ORDER — SUMATRIPTAN SUCCINATE 100 MG/1
100 TABLET ORAL DAILY PRN
Qty: 30 TABLET | Refills: 1 | Status: SHIPPED | OUTPATIENT
Start: 2025-02-10 | End: 2025-02-10 | Stop reason: SDUPTHER

## 2025-02-10 SDOH — ECONOMIC STABILITY: FOOD INSECURITY: WITHIN THE PAST 12 MONTHS, THE FOOD YOU BOUGHT JUST DIDN'T LAST AND YOU DIDN'T HAVE MONEY TO GET MORE.: NEVER TRUE

## 2025-02-10 SDOH — ECONOMIC STABILITY: FOOD INSECURITY: WITHIN THE PAST 12 MONTHS, YOU WORRIED THAT YOUR FOOD WOULD RUN OUT BEFORE YOU GOT MONEY TO BUY MORE.: NEVER TRUE

## 2025-02-10 ASSESSMENT — ENCOUNTER SYMPTOMS
SHORTNESS OF BREATH: 0
DIARRHEA: 0
NAUSEA: 0
VOMITING: 0
ABDOMINAL PAIN: 0
WHEEZING: 0
COUGH: 0

## 2025-02-10 ASSESSMENT — PATIENT HEALTH QUESTIONNAIRE - PHQ9
SUM OF ALL RESPONSES TO PHQ QUESTIONS 1-9: 0
8. MOVING OR SPEAKING SO SLOWLY THAT OTHER PEOPLE COULD HAVE NOTICED. OR THE OPPOSITE, BEING SO FIGETY OR RESTLESS THAT YOU HAVE BEEN MOVING AROUND A LOT MORE THAN USUAL: NOT AT ALL
1. LITTLE INTEREST OR PLEASURE IN DOING THINGS: NOT AT ALL
SUM OF ALL RESPONSES TO PHQ QUESTIONS 1-9: 0
3. TROUBLE FALLING OR STAYING ASLEEP: NOT AT ALL
SUM OF ALL RESPONSES TO PHQ QUESTIONS 1-9: 0
9. THOUGHTS THAT YOU WOULD BE BETTER OFF DEAD, OR OF HURTING YOURSELF: NOT AT ALL
7. TROUBLE CONCENTRATING ON THINGS, SUCH AS READING THE NEWSPAPER OR WATCHING TELEVISION: NOT AT ALL
SUM OF ALL RESPONSES TO PHQ9 QUESTIONS 1 & 2: 0
6. FEELING BAD ABOUT YOURSELF - OR THAT YOU ARE A FAILURE OR HAVE LET YOURSELF OR YOUR FAMILY DOWN: NOT AT ALL
2. FEELING DOWN, DEPRESSED OR HOPELESS: NOT AT ALL
10. IF YOU CHECKED OFF ANY PROBLEMS, HOW DIFFICULT HAVE THESE PROBLEMS MADE IT FOR YOU TO DO YOUR WORK, TAKE CARE OF THINGS AT HOME, OR GET ALONG WITH OTHER PEOPLE: NOT DIFFICULT AT ALL
SUM OF ALL RESPONSES TO PHQ QUESTIONS 1-9: 0
5. POOR APPETITE OR OVEREATING: NOT AT ALL
4. FEELING TIRED OR HAVING LITTLE ENERGY: NOT AT ALL

## 2025-02-10 NOTE — PROGRESS NOTES
losartan (COZAAR) 50 MG tablet Take 1.5 tablets by mouth daily (Patient taking differently: Take 1.5 tablets by mouth daily Takes 1 tablet daily) 135 tablet 1    cyclobenzaprine (FLEXERIL) 5 MG tablet Take 1 tablet by mouth 3 times daily as needed for Muscle spasms 90 tablet 2    Lancets MISC Use to monitor blood sugar daily. Dx: E11. Insurance preference      Hyoscyamine Sulfate SL 0.125 MG SUBL Take 1 tablet by mouth every 6 hours as needed      meloxicam (MOBIC) 15 MG tablet Take 1 tablet by mouth daily as needed       No current facility-administered medications for this visit.       Review of Systems:  Review of Systems   Constitutional:  Negative for activity change, appetite change, fever and unexpected weight change.   HENT:  Negative for congestion.    Respiratory:  Negative for cough, shortness of breath and wheezing.    Cardiovascular:  Negative for chest pain, palpitations and leg swelling.   Gastrointestinal:  Negative for abdominal pain, diarrhea, nausea and vomiting.   Skin:  Negative for pallor.   Neurological:  Positive for headaches (migraines). Negative for dizziness, seizures, syncope and weakness.   Psychiatric/Behavioral:  Negative for dysphoric mood. The patient is not nervous/anxious.               See HPI for other pertinent positives and negatives.     Objective:  Vitals:    02/10/25 0933   BP: 111/68   Site: Right Upper Arm   Position: Sitting   Cuff Size: Large Adult   Pulse: 90   Temp: 98.1 °F (36.7 °C)   TempSrc: Temporal   SpO2: 98%   Weight: 73.5 kg (162 lb 2 oz)   Height: 1.6 m (5' 3\")       Body mass index is 28.72 kg/m².    Physical Exam  Vitals and nursing note reviewed.   Constitutional:       General: She is not in acute distress.     Appearance: Normal appearance. She is not ill-appearing or diaphoretic.   HENT:      Head: Normocephalic and atraumatic.   Eyes:      General: No scleral icterus.        Right eye: No discharge.         Left eye: No discharge.   Cardiovascular:

## 2025-02-10 NOTE — PROGRESS NOTES
[facility] [address], [address]  [place] [address]  [phone]      Chief Complaint   Patient presents with    Follow-up     Occipital neuralgia of right side       HPI    Farrah Cho is a 48 y.o. female who presents for follow up for occipital neuralgia and hx of migraines.       Interval History:  Continues to be happy with her current migraine med regimen.  We will refill her topiramate and sumatriptan.  Comes today due to flareup of occipital neuralgia.  Unfortunately she came down with the flu several weeks ago which aggravated her occipital neuralgia.  Comes today for occipital nerve block     Past Medical History:   Diagnosis Date    Colon cancer screening     cologurad 2022    Depression     Dyslipidemia     Essential hypertension     GERD (gastroesophageal reflux disease)     H/O cardiac radiofrequency ablation 2009    H/O paroxysmal supraventricular tachycardia 2009    Migraine headache     Type 2 diabetes mellitus (HCC)        Past Surgical History:   Procedure Laterality Date    [place] UNLISTED PROCEDURE CARDIAC SURGERY  2009    Ablation-SVT       Family History   Problem Relation Age of Onset    Cancer Father         Renal    Hypertension Father     Heart Disease Father     Diabetes Father     Breast Cancer Neg Hx     Thyroid Disease Mother        Social History     Socioeconomic History    Marital status:      Spouse name: None    Number of children: None    Years of education: None    Highest education level: None   Tobacco Use    Smoking status: Former     Current packs/day: 0.50     Average packs/day: 0.5 packs/day for 15.0 years (7.5 ttl pk-yrs)     Types: Cigarettes    Smokeless tobacco: Never   Vaping Use    Vaping status: Never Used   Substance and Sexual Activity    Alcohol use: Never    Drug use: Never    Sexual activity: Yes     Partners: Male     Birth control/protection: Pill   Social History Narrative    Denies any sexual or physical abuse and

## 2025-02-11 LAB — PATH REV BLD -IMP: NORMAL

## 2025-02-11 NOTE — PROGRESS NOTES
Procedure Note: right Occipital Nerve Block     Indications: Occipital Neuralgia     Informed consent was obtained (explaining the procedure and risks and benefits of procedure) from patient: this included the risk of bleeding, infection, pain.   A time out was completed, verifying correct patient, procedure,site, positioning, and implants or special equipment.     A timeout was performed confirming proper patient using name and , and proper procedure. Patient's occipital areas were palpated to identify location of greater occipital nerve. Alcohol was applied topically to the skin. Using a 27 gauge needle (aspirating during insertion), 5 cc 1% lidocaine was injected, 3.5 mL on the right greater occipital nerve, and 1.5 mL on the right lesser occipital nerve (directing needle to center, left and right of painful focus). Patient was comfortable and left without complaint.        DARYA Rizzo  Neurology

## 2025-02-12 DIAGNOSIS — D47.2 GAMMOPATHY: Primary | ICD-10-CM

## 2025-02-16 LAB
IGA SERPL-MCNC: 869 MG/DL (ref 87–352)
IGG SERPL-MCNC: 2927 MG/DL (ref 586–1602)
IGM SERPL-MCNC: 186 MG/DL (ref 26–217)
PROT PATTERN SERPL IFE-IMP: ABNORMAL

## 2025-02-17 LAB — ALBUMIN/GLOB UR ELPH: NORMAL

## 2025-02-19 NOTE — PROGRESS NOTES
NEW PATIENT ABSTRACT            Referral Diagnosis: Gammopathy    Referring Provider: Katelyn Romero APRN- CNP    Primary Care Provider: Katelyn Romero APRN - CNP    Presenting Symptoms: None    Family History of Cancer: Cancer-related family history includes Cancer in her father. There is no history of Breast Cancer.    Past Medical History:   Past Medical History:   Diagnosis Date    Colon cancer screening     cologurad     Depression     Dyslipidemia     Essential hypertension     GERD (gastroesophageal reflux disease)     H/O cardiac radiofrequency ablation     H/O paroxysmal supraventricular tachycardia 2009    Migraine headache     Type 2 diabetes mellitus (HCC)        Family/ Social/ Medical/ Surgical History Updated in Epic: Yes    Chronological History of Pertinent Events (From Onset of Presenting Symptoms):  2/10/25- Ig, IgA,869, IgM: 186  2/10/25- Path Review, Smear: Unremarkable    Record located in Epic.      Pertinent Notes from Referring Provider: None    Other Pertinent Information: None

## 2025-02-20 ENCOUNTER — HOSPITAL ENCOUNTER (OUTPATIENT)
Dept: LAB | Age: 50
Discharge: HOME | End: 2025-02-20
Payer: COMMERCIAL

## 2025-02-20 ENCOUNTER — OFFICE VISIT (OUTPATIENT)
Dept: ONCOLOGY | Age: 50
End: 2025-02-20
Payer: COMMERCIAL

## 2025-02-20 VITALS
WEIGHT: 162.4 LBS | BODY MASS INDEX: 28.77 KG/M2 | SYSTOLIC BLOOD PRESSURE: 152 MMHG | OXYGEN SATURATION: 100 % | RESPIRATION RATE: 16 BRPM | HEIGHT: 63 IN | TEMPERATURE: 98.1 F | DIASTOLIC BLOOD PRESSURE: 93 MMHG | HEART RATE: 98 BPM

## 2025-02-20 DIAGNOSIS — D75.89 MACROCYTOSIS: Primary | ICD-10-CM

## 2025-02-20 DIAGNOSIS — D75.89 MACROCYTOSIS: ICD-10-CM

## 2025-02-20 DIAGNOSIS — D47.2 GAMMOPATHY: ICD-10-CM

## 2025-02-20 LAB
ALBUMIN SERPL-MCNC: 3.4 G/DL (ref 3.5–5)
ALBUMIN/GLOB SERPL: 0.6 (ref 1–1.9)
ALP SERPL-CCNC: 59 U/L (ref 35–104)
ALT SERPL-CCNC: 19 U/L (ref 8–45)
ANION GAP SERPL CALC-SCNC: 11 MMOL/L (ref 7–16)
AST SERPL-CCNC: 19 U/L (ref 15–37)
BASOPHILS # BLD: 0.04 K/UL (ref 0–0.2)
BASOPHILS NFR BLD: 0.8 % (ref 0–2)
BILIRUB SERPL-MCNC: 0.3 MG/DL (ref 0–1.2)
BUN SERPL-MCNC: 12 MG/DL (ref 6–23)
CALCIUM SERPL-MCNC: 9.7 MG/DL (ref 8.8–10.2)
CHLORIDE SERPL-SCNC: 103 MMOL/L (ref 98–107)
CO2 SERPL-SCNC: 20 MMOL/L (ref 20–29)
CREAT SERPL-MCNC: 0.8 MG/DL (ref 0.6–1.1)
DIFFERENTIAL METHOD BLD: ABNORMAL
EOSINOPHIL # BLD: 0.14 K/UL (ref 0–0.8)
EOSINOPHIL NFR BLD: 2.7 % (ref 0.5–7.8)
ERYTHROCYTE [DISTWIDTH] IN BLOOD BY AUTOMATED COUNT: 12.4 % (ref 11.9–14.6)
FOLATE SERPL-MCNC: 6.1 NG/ML (ref 3.1–17.5)
GLOBULIN SER CALC-MCNC: 5.9 G/DL (ref 2.3–3.5)
GLUCOSE SERPL-MCNC: 185 MG/DL (ref 70–99)
HCT VFR BLD AUTO: 39.8 % (ref 35.8–46.3)
HGB BLD-MCNC: 13.5 G/DL (ref 11.7–15.4)
HGB RETIC QN AUTO: 36 PG (ref 29–35)
IMM GRANULOCYTES # BLD AUTO: 0.03 K/UL (ref 0–0.5)
IMM GRANULOCYTES NFR BLD AUTO: 0.6 % (ref 0–5)
IMM RETICS NFR: 11.6 % (ref 3–15.9)
KAPPA LC FREE SER-MCNC: 62.3 MG/L (ref 2.4–20.7)
KAPPA LC FREE/LAMBDA FREE SER: 1.8 (ref 0.2–0.8)
LAMBDA LC FREE SERPL-MCNC: 34.2 MG/L (ref 4.2–27.7)
LDH SERPL L TO P-CCNC: 115 U/L (ref 127–281)
LYMPHOCYTES # BLD: 1.82 K/UL (ref 0.5–4.6)
LYMPHOCYTES NFR BLD: 35.2 % (ref 13–44)
MCH RBC QN AUTO: 33.3 PG (ref 26.1–32.9)
MCHC RBC AUTO-ENTMCNC: 33.9 G/DL (ref 31.4–35)
MCV RBC AUTO: 98.3 FL (ref 82–102)
MONOCYTES # BLD: 0.34 K/UL (ref 0.1–1.3)
MONOCYTES NFR BLD: 6.6 % (ref 4–12)
NEUTS SEG # BLD: 2.8 K/UL (ref 1.7–8.2)
NEUTS SEG NFR BLD: 54.1 % (ref 43–78)
NRBC # BLD: 0 K/UL (ref 0–0.2)
PLATELET # BLD AUTO: 262 K/UL (ref 150–450)
PMV BLD AUTO: 9.2 FL (ref 9.4–12.3)
POTASSIUM SERPL-SCNC: 3.8 MMOL/L (ref 3.5–5.1)
PROT SERPL-MCNC: 9.3 G/DL (ref 6.3–8.2)
RBC # BLD AUTO: 4.05 M/UL (ref 4.05–5.2)
RETICS # AUTO: 0.06 M/UL (ref 0.03–0.1)
RETICS/RBC NFR AUTO: 1.6 % (ref 0.3–2)
SODIUM SERPL-SCNC: 134 MMOL/L (ref 136–145)
TSH, 3RD GENERATION: 1.02 UIU/ML (ref 0.27–4.2)
VIT B12 SERPL-MCNC: 321 PG/ML (ref 193–986)
WBC # BLD AUTO: 5.2 K/UL (ref 4.3–11.1)

## 2025-02-20 PROCEDURE — 82746 ASSAY OF FOLIC ACID SERUM: CPT

## 2025-02-20 PROCEDURE — 83921 ORGANIC ACID SINGLE QUANT: CPT

## 2025-02-20 PROCEDURE — 86235 NUCLEAR ANTIGEN ANTIBODY: CPT

## 2025-02-20 PROCEDURE — 84630 ASSAY OF ZINC: CPT

## 2025-02-20 PROCEDURE — 82668 ASSAY OF ERYTHROPOIETIN: CPT

## 2025-02-20 PROCEDURE — 36415 COLL VENOUS BLD VENIPUNCTURE: CPT

## 2025-02-20 PROCEDURE — 3080F DIAST BP >= 90 MM HG: CPT | Performed by: INTERNAL MEDICINE

## 2025-02-20 PROCEDURE — 84443 ASSAY THYROID STIM HORMONE: CPT

## 2025-02-20 PROCEDURE — 80053 COMPREHEN METABOLIC PANEL: CPT

## 2025-02-20 PROCEDURE — 99243 OFF/OP CNSLTJ NEW/EST LOW 30: CPT | Performed by: INTERNAL MEDICINE

## 2025-02-20 PROCEDURE — 85025 COMPLETE CBC W/AUTO DIFF WBC: CPT

## 2025-02-20 PROCEDURE — 83615 LACTATE (LD) (LDH) ENZYME: CPT

## 2025-02-20 PROCEDURE — 82607 VITAMIN B-12: CPT

## 2025-02-20 PROCEDURE — 83521 IG LIGHT CHAINS FREE EACH: CPT

## 2025-02-20 PROCEDURE — 0077U IG PARAPROTEIN QUAL BLD/UR: CPT

## 2025-02-20 PROCEDURE — 3077F SYST BP >= 140 MM HG: CPT | Performed by: INTERNAL MEDICINE

## 2025-02-20 PROCEDURE — 86038 ANTINUCLEAR ANTIBODIES: CPT

## 2025-02-20 PROCEDURE — 82784 ASSAY IGA/IGD/IGG/IGM EACH: CPT

## 2025-02-20 PROCEDURE — 86225 DNA ANTIBODY NATIVE: CPT

## 2025-02-20 PROCEDURE — 85046 RETICYTE/HGB CONCENTRATE: CPT

## 2025-02-20 PROCEDURE — 82525 ASSAY OF COPPER: CPT

## 2025-02-20 ASSESSMENT — PATIENT HEALTH QUESTIONNAIRE - PHQ9
SUM OF ALL RESPONSES TO PHQ QUESTIONS 1-9: 0
SUM OF ALL RESPONSES TO PHQ9 QUESTIONS 1 & 2: 0
1. LITTLE INTEREST OR PLEASURE IN DOING THINGS: NOT AT ALL
SUM OF ALL RESPONSES TO PHQ QUESTIONS 1-9: 0
2. FEELING DOWN, DEPRESSED OR HOPELESS: NOT AT ALL

## 2025-02-20 NOTE — PATIENT INSTRUCTIONS
Patient Information from Today's Visit    Diagnosis: polyclonal gammopathy      Follow Up Instructions: 4 weeks follow-up     Blood work today    Treatment Summary has been discussed and given to patient: N/A      Current Labs: N/A              Please refer to After Visit Summary or [employer] for upcoming appointment information. If you have any questions regarding your upcoming schedule please reach out to your care team through [employer] or call [phone].    Please notify your assigned Nurse Navigator of any unplanned hospital admissions or Emergency Room visits within 24 hours of discharge.    -------------------------------------------------------------------------------------------------------------------  Please call our office at [phone] if you have any  of the following symptoms:   Fever of 100.5 or greater  Chills  Shortness of breath  Swelling or pain in one leg    After office hours an answering service is available and will contact a provider for emergencies or if you are experiencing any of the above symptoms.        Crista Hilton RN

## 2025-02-20 NOTE — PROGRESS NOTES
1.5 tablets by mouth daily Takes 1 tablet daily) 135 tablet 1    Lancets MISC Use to monitor blood sugar daily. Dx: E11. Insurance preference      Hyoscyamine Sulfate SL 0.125 MG SUBL Take 1 tablet by mouth every 6 hours as needed      meloxicam (MOBIC) 15 MG tablet Take 1 tablet by mouth daily as needed       No current facility-administered medications for this visit.       Physical Exam  ECOG - (0) Fully active, able to carry on all predisease performance without restriction  BP (!) 152/93   Pulse 98   Temp 98.1 °F (36.7 °C)   Resp 16   Ht 1.6 m (5' 3\")   Wt 73.7 kg (162 lb 6.4 oz)   SpO2 100%   BMI 28.77 kg/m²    Gen - appears stated age, no acute distress  HEENT - NC/AT, no scleral icterus, no cervical/occipital LAD  CV - RRR, no MRG  PULM - CTAB  Abdominal - Soft, NT/ND, no guarding, +bowel sounds  Ext - No CCE  Neuro - nonfocal    Labs  Lab Results   Component Value Date     02/10/2025    K 4.7 02/10/2025     02/10/2025    CO2 22 02/10/2025    BUN 13 02/10/2025    CREATININE 0.89 02/10/2025    GLUCOSE 142 (H) 02/10/2025    CALCIUM 9.5 02/10/2025    BILITOT 0.4 02/10/2025    ALKPHOS 59 02/10/2025    AST 20 02/10/2025    ALT 23 02/10/2025    LABGLOM 80 02/10/2025    GFRAA 111 02/11/2022    AGRATIO 1.2 08/24/2020    GLOB 5.4 (H) 02/10/2025        Lab Results   Component Value Date    WBC 5.5 02/10/2025    HGB 14.3 02/10/2025    HCT 44.6 02/10/2025    .8 (H) 02/10/2025     02/10/2025    LYMPHOPCT 39.9 02/10/2025    RBC 4.34 02/10/2025    MCH 32.9 02/10/2025    MCHC 32.1 02/10/2025    RDW 12.4 02/10/2025       Assessment & Plan  Farrah Cho is a 49 y.o. lady with hypertension/hyperlipidemia, diabetes, migraines who presents for workup of the above.    Polyclonal gammopathy -lengthy discussion regarding her laboratory abnormalities, potential etiologies and directions forward.  Explained that she has mild increase in her immunoglobulins, specifically IgG.  Most recent

## 2025-02-22 LAB
ANA SER QL: POSITIVE
CENTROMERE B AB SER-ACNC: <0.2 AI (ref 0–0.9)
CHROMATIN AB SERPL-ACNC: <0.2 AI (ref 0–0.9)
COPPER SERPL-MCNC: 219 UG/DL (ref 80–158)
DSDNA AB SER-ACNC: <1 IU/ML (ref 0–9)
ENA JO1 AB SER-ACNC: <0.2 AI (ref 0–0.9)
ENA RNP AB SER-ACNC: 0.2 AI (ref 0–0.9)
ENA SCL70 AB SER-ACNC: <0.2 AI (ref 0–0.9)
ENA SM AB SER-ACNC: <0.2 AI (ref 0–0.9)
ENA SS-A AB SER-ACNC: >8 AI (ref 0–0.9)
ENA SS-B AB SER-ACNC: >8 AI (ref 0–0.9)
IGA SERPL-MCNC: 772 MG/DL (ref 87–352)
IGG SERPL-MCNC: 2686 MG/DL (ref 586–1602)
IGM SERPL-MCNC: 185 MG/DL (ref 26–217)
Lab: ABNORMAL
METHYLMALONATE SERPL-SCNC: 165 NMOL/L (ref 0–378)
ZINC SERPL-MCNC: 71 UG/DL (ref 44–115)

## 2025-02-23 LAB — EPO SERPL-ACNC: 7.2 MIU/ML (ref 2.6–18.5)

## 2025-02-24 LAB — IMMUNOLOGIST REVIEW: NORMAL

## 2025-02-27 DIAGNOSIS — R76.8 POSITIVE ANA (ANTINUCLEAR ANTIBODY): Primary | ICD-10-CM

## 2025-03-20 ENCOUNTER — OFFICE VISIT (OUTPATIENT)
Dept: ONCOLOGY | Age: 50
End: 2025-03-20
Payer: COMMERCIAL

## 2025-03-20 VITALS
RESPIRATION RATE: 15 BRPM | HEART RATE: 85 BPM | DIASTOLIC BLOOD PRESSURE: 70 MMHG | WEIGHT: 163.4 LBS | TEMPERATURE: 98.3 F | BODY MASS INDEX: 28.95 KG/M2 | SYSTOLIC BLOOD PRESSURE: 127 MMHG | HEIGHT: 63 IN | OXYGEN SATURATION: 97 %

## 2025-03-20 DIAGNOSIS — D47.2 GAMMOPATHY: Primary | ICD-10-CM

## 2025-03-20 DIAGNOSIS — D75.89 MACROCYTOSIS: ICD-10-CM

## 2025-03-20 PROCEDURE — 3074F SYST BP LT 130 MM HG: CPT | Performed by: INTERNAL MEDICINE

## 2025-03-20 PROCEDURE — 3078F DIAST BP <80 MM HG: CPT | Performed by: INTERNAL MEDICINE

## 2025-03-20 PROCEDURE — 99213 OFFICE O/P EST LOW 20 MIN: CPT | Performed by: INTERNAL MEDICINE

## 2025-03-20 ASSESSMENT — PATIENT HEALTH QUESTIONNAIRE - PHQ9
SUM OF ALL RESPONSES TO PHQ QUESTIONS 1-9: 0
2. FEELING DOWN, DEPRESSED OR HOPELESS: NOT AT ALL
SUM OF ALL RESPONSES TO PHQ QUESTIONS 1-9: 0
1. LITTLE INTEREST OR PLEASURE IN DOING THINGS: NOT AT ALL

## 2025-03-20 NOTE — PATIENT INSTRUCTIONS
Patient Information from Today's Visit    Diagnosis: polyclonal gammopathy      Follow Up Instructions: AS NEEDED        Treatment Summary has been discussed and given to patient: N/A      Current Labs: N/A              Please refer to After Visit Summary or MyChart for upcoming appointment information. If you have any questions regarding your upcoming schedule please reach out to your care team through [facility] or call [phone].    Please notify your assigned Nurse Navigator of any unplanned hospital admissions or Emergency Room visits within 24 hours of discharge.    -------------------------------------------------------------------------------------------------------------------  Please call our office at [phone] if you have any  of the following symptoms:   Fever of 100.5 or greater  Chills  Shortness of breath  Swelling or pain in one leg    After office hours an answering service is available and will contact a provider for emergencies or if you are experiencing any of the above symptoms.        Crista Hilton RN

## 2025-03-20 NOTE — PROGRESS NOTES
[facility]  [address] [place] [address]  Grover Mckeon MD    Patient Name Farrah Cho   MRN [id number]   Date 03/20/25     Hematology/Oncology Diagnosis  Polyclonal gammopathy  Macrocytosis    History of Present Illness  Farrah Cho is a 49 y.o. lady with hypertension/hyperlipidemia, diabetes, migraines who presents for follow-up.    Doing well.  No new complaints.  Possible photosensitivity but otherwise no dry mouth, changes in urinary quality.  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    ROS   12 point review of system negative except as noted in HPI    Past Medical History:   Diagnosis Date    Colon cancer screening     cologurad 2022    Depression     Dyslipidemia     Essential hypertension     GERD (gastroesophageal reflux disease)     H/O cardiac radiofrequency ablation 2009    H/O paroxysmal supraventricular tachycardia 2009    Migraine headache     Type 2 diabetes mellitus (HCC)         Past Surgical History:   Procedure Laterality Date    [place] UNLISTED PROCEDURE CARDIAC SURGERY  2009    Ablation-SVT        Social History     Socioeconomic History    Marital status:      Spouse name: Not on file    Number of children: Not on file    Years of education: Not on file    Highest education level: Not on file   Occupational History    Not on file   Tobacco Use    Smoking status: Former     Current packs/day: 0.50     Average packs/day: 0.5 packs/day for 15.0 years (7.5 ttl pk-yrs)     Types: Cigarettes    Smokeless tobacco: Never   Vaping Use    Vaping status: Never Used   Substance and Sexual Activity    Alcohol use: Never    Drug use: Never    Sexual activity: Yes     Partners: Male     Birth control/protection: Pill   Other Topics Concern    Not on file   Social History Narrative    Denies any sexual or physical abuse and feels safe at home.      Social Drivers of Health     Financial Resource Strain: Low Risk  (8/8/2024)    Overall Financial Resource

## 2025-03-28 ENCOUNTER — OFFICE VISIT (OUTPATIENT)
Dept: RHEUMATOLOGY | Age: 50
End: 2025-03-28
Payer: COMMERCIAL

## 2025-03-28 VITALS
DIASTOLIC BLOOD PRESSURE: 86 MMHG | HEIGHT: 63 IN | OXYGEN SATURATION: 97 % | WEIGHT: 161 LBS | SYSTOLIC BLOOD PRESSURE: 128 MMHG | BODY MASS INDEX: 28.53 KG/M2 | HEART RATE: 84 BPM

## 2025-03-28 DIAGNOSIS — R76.8 SS-A ANTIBODY POSITIVE: Primary | ICD-10-CM

## 2025-03-28 DIAGNOSIS — R76.8 SS-B ANTIBODY POSITIVE: ICD-10-CM

## 2025-03-28 PROCEDURE — 3074F SYST BP LT 130 MM HG: CPT | Performed by: INTERNAL MEDICINE

## 2025-03-28 PROCEDURE — 99203 OFFICE O/P NEW LOW 30 MIN: CPT | Performed by: INTERNAL MEDICINE

## 2025-03-28 PROCEDURE — 3079F DIAST BP 80-89 MM HG: CPT | Performed by: INTERNAL MEDICINE

## 2025-03-28 ASSESSMENT — ROUTINE ASSESSMENT OF PATIENT INDEX DATA (RAPID3)
ON A SCALE OF ONE TO TEN, HOW MUCH PAIN HAVE YOU HAD BECAUSE OF YOUR CONDITION OVER THE PAST WEEK?: 0
ON A SCALE OF ONE TO TEN, HOW DIFFICULT WAS IT FOR YOU TO COMPLETE THE LISTED DAILY PHYSICAL TASKS OVER THE LAST WEEK: 0.0
WHEN YOU AWAKENED IN THE MORNING OVER THE LAST WEEK, PLEASE INDICATE THE AMOUNT OF TIME IT TAKES UNTIL YOU ARE AS LIMBER AS YOU WILL BE FOR THE DAY: < 10 MIN
ON A SCALE OF ONE TO TEN, HOW MUCH OF A PROBLEM HAS UNUSUAL FATIGUE OR TIREDNESS BEEN FOR YOU OVER THE PAST WEEK?: 0
ON A SCALE OF ONE TO TEN, CONSIDERING ALL THE WAYS IN WHICH ILLNESS AND HEALTH CONDITIONS MAY AFFECT YOU AT THIS TIME, PLEASE INDICATE BELOW HOW YOU ARE DOING:: 0

## 2025-03-28 NOTE — PROGRESS NOTES
[facility]  Narinder Bautista M.D.  [address] [place] [address]  Office : [phone], Fax: [phone]      3/28/2025    Dear Dr. Mckeon,    Thank you for asking me to assist in the care of your patient Farrah Cho who was seen in my office on 3/28/2025 for evaluation of a positive anti-SSA as well as anti-SSB antibody titer. I have included the full consultation note below.  I will continue to follow your patient and will forward all future office visit notes to you.  If you have any questions or concerns about any aspect of your patient's care, please do not hesitate to contact me.    I look forward to continuing to care for this patient with you.    Sincerely,    Dr. Bautista          RHEUMATOLOGY INITIAL CONSULTATION NOTE  Date of Visit:  3/28/2025 7:46 AM    Patient Information:  Name:  Farrah Cho  :  1975  Age:  49 y.o.   Gender:  female    PHYSICIAN REQUESTING CONSULTATION:  Dr. Mckeon.     Chief Complaint:  Chief Complaint   Patient presents with    New Patient     History of Present Illness:  Farrah Cho is a 49 y.o. female who was referred for evaluation of a positive anti-SSA as well as anti-SSB antibody titer.  On talking to the patient today she states that she was referred to the hematologist for a polyclonal gammopathy with macrocytosis but he has released her from his care unconcerned for any underlying hemopoietic pathology, but since her KASEY came back positive at that workup she was referred to me.  Patient states that she has not had any underlying joint pain but has had occasional muscle pain as mentioned below.    Medical records were thoroughly reviewed and history was also obtained from patient:    Overall, she says she is feeling \"very good\".    Pain: 1/10  Location:  Bilateral thigh pain and right para spinal muscle pain with occipital headaches with a h/o migraines. Some stiffness in the PIP joints of both the hands.

## 2025-04-30 ENCOUNTER — PATIENT MESSAGE (OUTPATIENT)
Dept: OBGYN CLINIC | Age: 50
End: 2025-04-30

## 2025-04-30 DIAGNOSIS — K21.9 GASTROESOPHAGEAL REFLUX DISEASE, UNSPECIFIED WHETHER ESOPHAGITIS PRESENT: ICD-10-CM

## 2025-04-30 DIAGNOSIS — Z30.41 ENCOUNTER FOR SURVEILLANCE OF CONTRACEPTIVE PILLS: ICD-10-CM

## 2025-04-30 RX ORDER — PANTOPRAZOLE SODIUM 40 MG/1
40 TABLET, DELAYED RELEASE ORAL DAILY
Qty: 90 TABLET | Refills: 1 | OUTPATIENT
Start: 2025-04-30

## 2025-04-30 RX ORDER — LEVONORGESTREL AND ETHINYL ESTRADIOL 100-20(84)
KIT ORAL
Qty: 1 PACKET | Refills: 0 | Status: SHIPPED | OUTPATIENT
Start: 2025-04-30

## 2025-05-01 RX ORDER — PANTOPRAZOLE SODIUM 40 MG/1
40 TABLET, DELAYED RELEASE ORAL DAILY
Qty: 90 TABLET | Refills: 1 | Status: SHIPPED | OUTPATIENT
Start: 2025-05-01

## 2025-05-07 DIAGNOSIS — I10 ESSENTIAL HYPERTENSION: ICD-10-CM

## 2025-05-08 RX ORDER — LOSARTAN POTASSIUM 50 MG/1
75 TABLET ORAL DAILY
Qty: 135 TABLET | Refills: 1 | Status: SHIPPED | OUTPATIENT
Start: 2025-05-08

## 2025-06-02 NOTE — PROGRESS NOTES
HPI    Farrah Cho is a 50 y.o. female seen for annual GYN exam.  She is still working in pre-assessment.  She takes the continuous pills for migraine headaches    Past Medical History, Past Surgical History, Family history, Social History, and Medications were all reviewed with the patient today and updated as necessary.     Current Outpatient Medications   Medication Sig    Levonorgest-Eth Estrad 91-Day 0.1-0.02 & 0.01 MG TABS TAKE 1 TABLET BY MOUTH EVERY DAY    losartan (COZAAR) 50 MG tablet Take 1.5 tablets by mouth daily    pantoprazole (PROTONIX) 40 MG tablet Take 1 tablet by mouth daily    atorvastatin (LIPITOR) 20 MG tablet Take 1 tablet by mouth at bedtime    citalopram (CELEXA) 40 MG tablet Take 1 tablet by mouth daily    metFORMIN (GLUCOPHAGE) 500 MG tablet Take 1.5 tablets by mouth 2 times daily (with meals)    metoprolol succinate (TOPROL XL) 25 MG extended release tablet Take 2 tablets by mouth daily    SUMAtriptan (IMITREX) 100 MG tablet Take 1 tablet by mouth daily as needed for Migraine May repeat dose in 2 hours if migraine unrelieved. Maximum daily dose 200mg.    cyclobenzaprine (FLEXERIL) 5 MG tablet Take 1 tablet by mouth 3 times daily as needed for Muscle spasms    topiramate ER (TROKENDI XR) 50 MG CP24 Take 50 mg by mouth daily    Lancets MISC Use to monitor blood sugar daily. Dx: E11. Insurance preference    Hyoscyamine Sulfate SL 0.125 MG SUBL Take 1 tablet by mouth every 6 hours as needed    meloxicam (MOBIC) 15 MG tablet Take 1 tablet by mouth daily as needed (Patient not taking: Reported on 6/4/2025)     No current facility-administered medications for this visit.     Allergies   Allergen Reactions    Lisinopril Cough     Past Medical History:   Diagnosis Date    Colon cancer screening     cologurad 2022    Depression     Dyslipidemia     Essential hypertension     GERD (gastroesophageal reflux disease)     H/O cardiac radiofrequency ablation 2009    H/O paroxysmal supraventricular

## 2025-06-04 ENCOUNTER — OFFICE VISIT (OUTPATIENT)
Dept: OBGYN CLINIC | Age: 50
End: 2025-06-04
Payer: COMMERCIAL

## 2025-06-04 VITALS
HEIGHT: 63 IN | WEIGHT: 161 LBS | DIASTOLIC BLOOD PRESSURE: 78 MMHG | SYSTOLIC BLOOD PRESSURE: 134 MMHG | BODY MASS INDEX: 28.53 KG/M2

## 2025-06-04 DIAGNOSIS — Z12.4 SCREENING FOR MALIGNANT NEOPLASM OF CERVIX: ICD-10-CM

## 2025-06-04 DIAGNOSIS — Z30.41 ENCOUNTER FOR SURVEILLANCE OF CONTRACEPTIVE PILLS: ICD-10-CM

## 2025-06-04 DIAGNOSIS — Z01.419 WELL WOMAN EXAM: Primary | ICD-10-CM

## 2025-06-04 PROCEDURE — 99459 PELVIC EXAMINATION: CPT | Performed by: OBSTETRICS & GYNECOLOGY

## 2025-06-04 PROCEDURE — 99396 PREV VISIT EST AGE 40-64: CPT | Performed by: OBSTETRICS & GYNECOLOGY

## 2025-06-04 PROCEDURE — 3078F DIAST BP <80 MM HG: CPT | Performed by: OBSTETRICS & GYNECOLOGY

## 2025-06-04 PROCEDURE — 3075F SYST BP GE 130 - 139MM HG: CPT | Performed by: OBSTETRICS & GYNECOLOGY

## 2025-06-04 RX ORDER — LEVONORGESTREL AND ETHINYL ESTRADIOL 100-20(84)
KIT ORAL
Qty: 1 PACKET | Refills: 4 | Status: SHIPPED | OUTPATIENT
Start: 2025-06-04

## 2025-06-07 LAB
COLLECTION METHOD: NORMAL
CYTOLOGIST CVX/VAG CYTO: NORMAL
CYTOLOGY CVX/VAG DOC THIN PREP: NORMAL
DATE OF LMP: 0
HPV REFLEX: NORMAL
Lab: NORMAL
PAP SOURCE: NORMAL
PATH REPORT.FINAL DX SPEC: NORMAL
STAT OF ADQ CVX/VAG CYTO-IMP: NORMAL

## 2025-07-01 ENCOUNTER — OFFICE VISIT (OUTPATIENT)
Dept: RHEUMATOLOGY | Age: 50
End: 2025-07-01
Payer: COMMERCIAL

## 2025-07-01 VITALS
DIASTOLIC BLOOD PRESSURE: 72 MMHG | HEART RATE: 87 BPM | HEIGHT: 63 IN | WEIGHT: 160.6 LBS | SYSTOLIC BLOOD PRESSURE: 128 MMHG | BODY MASS INDEX: 28.46 KG/M2 | OXYGEN SATURATION: 99 %

## 2025-07-01 DIAGNOSIS — Z76.89 ENCOUNTER PRIOR TO INITIATION OF MEDICATION: ICD-10-CM

## 2025-07-01 DIAGNOSIS — M25.50 POLYARTHRALGIA: ICD-10-CM

## 2025-07-01 DIAGNOSIS — R76.8 SS-B ANTIBODY POSITIVE: ICD-10-CM

## 2025-07-01 DIAGNOSIS — R76.8 SS-A ANTIBODY POSITIVE: Primary | ICD-10-CM

## 2025-07-01 DIAGNOSIS — R76.8 SS-A ANTIBODY POSITIVE: ICD-10-CM

## 2025-07-01 LAB — CRP SERPL-MCNC: 0.5 MG/DL (ref 0–0.4)

## 2025-07-01 PROCEDURE — 3078F DIAST BP <80 MM HG: CPT | Performed by: INTERNAL MEDICINE

## 2025-07-01 PROCEDURE — 99214 OFFICE O/P EST MOD 30 MIN: CPT | Performed by: INTERNAL MEDICINE

## 2025-07-01 PROCEDURE — 3074F SYST BP LT 130 MM HG: CPT | Performed by: INTERNAL MEDICINE

## 2025-07-01 ASSESSMENT — ROUTINE ASSESSMENT OF PATIENT INDEX DATA (RAPID3)
ON A SCALE OF ONE TO TEN, HOW MUCH PAIN HAVE YOU HAD BECAUSE OF YOUR CONDITION OVER THE PAST WEEK?: 2
ON A SCALE OF ONE TO TEN, CONSIDERING ALL THE WAYS IN WHICH ILLNESS AND HEALTH CONDITIONS MAY AFFECT YOU AT THIS TIME, PLEASE INDICATE BELOW HOW YOU ARE DOING:: 0
ON A SCALE OF ONE TO TEN, HOW MUCH OF A PROBLEM HAS UNUSUAL FATIGUE OR TIREDNESS BEEN FOR YOU OVER THE PAST WEEK?: 2
ON A SCALE OF ONE TO TEN, HOW DIFFICULT WAS IT FOR YOU TO COMPLETE THE LISTED DAILY PHYSICAL TASKS OVER THE LAST WEEK: 0.0

## 2025-07-01 NOTE — PROGRESS NOTES
etc.): ibuprofen OTC, took celebrex in past for inflammation from a fall.    Last BMD: na  Past osteoporosis drugs, if applicable (fosamax, actonel, boniva, reclast, prolia, forteo):none    BMI:28.45  Current exercise regimen, if any: walks dogs daily   Current vitamin D dose: 1,000 daily   Current calcium dose:none  Fractures since last visit, if any: none    The patient otherwise has no significant interval changes in health or medical history to report.     History Reviewed:    Past Medical History  Past Medical History:   Diagnosis Date    Colon cancer screening     cologurad 2022    Depression     Dyslipidemia     Essential hypertension     GERD (gastroesophageal reflux disease)     H/O cardiac radiofrequency ablation 2009    H/O paroxysmal supraventricular tachycardia 2009    Migraine     Migraine headache     Type 2 diabetes mellitus (HCC)        Past Surgical History  Past Surgical History:   Procedure Laterality Date    [place] UNLISTED PROCEDURE CARDIAC SURGERY  2009    Ablation-SVT       Family History  Family History   Problem Relation Age of Onset    Cancer Father         Renal    Hypertension Father     Heart Disease Father     Diabetes Father     Thyroid Disease Mother     Breast Cancer Neg Hx        Social History  Social History     Socioeconomic History    Marital status:      Spouse name: None    Number of children: None    Years of education: None    Highest education level: None   Tobacco Use    Smoking status: Former     Current packs/day: 0.50     Average packs/day: 0.5 packs/day for 15.0 years (7.5 ttl pk-yrs)     Types: Cigarettes    Smokeless tobacco: Never   Vaping Use    Vaping status: Never Used   Substance and Sexual Activity    Alcohol use: Never    Drug use: Never    Sexual activity: Yes     Partners: Male     Birth control/protection: Pill   Social History Narrative    Denies any sexual or physical abuse and feels safe at home.      Social Drivers of Health     Financial Resource

## 2025-07-02 LAB
ENA SS-A AB SER-ACNC: >8 AI (ref 0–0.9)
ENA SS-B AB SER-ACNC: >8 AI (ref 0–0.9)
G6PD BLD QN: 290 U/10E12 RBC (ref 127–427)
RBC # BLD AUTO: 4.16 X10E6/UL (ref 3.77–5.28)

## 2025-07-15 ENCOUNTER — HOSPITAL ENCOUNTER (OUTPATIENT)
Dept: MAMMOGRAPHY | Age: 50
Discharge: HOME | End: 2025-07-18
Payer: COMMERCIAL

## 2025-07-15 DIAGNOSIS — Z12.31 SCREENING MAMMOGRAM FOR BREAST CANCER: ICD-10-CM

## 2025-07-15 LAB
CHOLEST SERPL-MCNC: 134 MG/DL (ref 0–200)
GLUCOSE SERPL-MCNC: 138 MG/DL (ref 70–99)
HDLC SERPL-MCNC: 26 MG/DL (ref 40–60)
LDLC SERPL CALC-MCNC: 75 MG/DL (ref 0–100)
TRIGL SERPL-MCNC: 168 MG/DL (ref 0–150)

## 2025-07-15 PROCEDURE — 77063 BREAST TOMOSYNTHESIS BI: CPT

## 2025-08-19 ENCOUNTER — OFFICE VISIT (OUTPATIENT)
Dept: INTERNAL MEDICINE CLINIC | Facility: CLINIC | Age: 50
End: 2025-08-19
Payer: COMMERCIAL

## 2025-08-19 VITALS
WEIGHT: 161 LBS | HEART RATE: 80 BPM | RESPIRATION RATE: 22 BRPM | BODY MASS INDEX: 28.53 KG/M2 | SYSTOLIC BLOOD PRESSURE: 118 MMHG | DIASTOLIC BLOOD PRESSURE: 80 MMHG | HEIGHT: 63 IN | OXYGEN SATURATION: 100 % | TEMPERATURE: 97.1 F

## 2025-08-19 DIAGNOSIS — Z12.11 ENCOUNTER FOR COLORECTAL CANCER SCREENING: ICD-10-CM

## 2025-08-19 DIAGNOSIS — I10 ESSENTIAL HYPERTENSION: ICD-10-CM

## 2025-08-19 DIAGNOSIS — G43.909 MIGRAINE SYNDROME: ICD-10-CM

## 2025-08-19 DIAGNOSIS — E78.5 DYSLIPIDEMIA: ICD-10-CM

## 2025-08-19 DIAGNOSIS — Z00.00 ANNUAL PHYSICAL EXAM: Primary | ICD-10-CM

## 2025-08-19 DIAGNOSIS — F32.A ANXIETY AND DEPRESSION: ICD-10-CM

## 2025-08-19 DIAGNOSIS — Z12.12 ENCOUNTER FOR COLORECTAL CANCER SCREENING: ICD-10-CM

## 2025-08-19 DIAGNOSIS — E11.9 TYPE 2 DIABETES MELLITUS WITHOUT COMPLICATION, WITHOUT LONG-TERM CURRENT USE OF INSULIN (HCC): ICD-10-CM

## 2025-08-19 DIAGNOSIS — Z00.00 ANNUAL PHYSICAL EXAM: ICD-10-CM

## 2025-08-19 DIAGNOSIS — E55.9 VITAMIN D DEFICIENCY: ICD-10-CM

## 2025-08-19 DIAGNOSIS — F41.9 ANXIETY AND DEPRESSION: ICD-10-CM

## 2025-08-19 DIAGNOSIS — K21.9 GASTROESOPHAGEAL REFLUX DISEASE, UNSPECIFIED WHETHER ESOPHAGITIS PRESENT: ICD-10-CM

## 2025-08-19 LAB
25(OH)D3 SERPL-MCNC: 35 NG/ML (ref 30–100)
ANION GAP SERPL CALC-SCNC: 11 MMOL/L (ref 7–16)
BUN SERPL-MCNC: 13 MG/DL (ref 6–23)
CALCIUM SERPL-MCNC: 9.2 MG/DL (ref 8.8–10.2)
CHLORIDE SERPL-SCNC: 104 MMOL/L (ref 98–107)
CO2 SERPL-SCNC: 22 MMOL/L (ref 20–29)
CREAT SERPL-MCNC: 0.92 MG/DL (ref 0.6–1.1)
EST. AVERAGE GLUCOSE BLD GHB EST-MCNC: 165 MG/DL
GLUCOSE SERPL-MCNC: 133 MG/DL (ref 70–99)
HBA1C MFR BLD: 7.4 % (ref 0–5.6)
POTASSIUM SERPL-SCNC: 4.5 MMOL/L (ref 3.5–5.1)
SODIUM SERPL-SCNC: 137 MMOL/L (ref 136–145)

## 2025-08-19 PROCEDURE — 3078F DIAST BP <80 MM HG: CPT | Performed by: NURSE PRACTITIONER

## 2025-08-19 PROCEDURE — 3074F SYST BP LT 130 MM HG: CPT | Performed by: NURSE PRACTITIONER

## 2025-08-19 PROCEDURE — 99396 PREV VISIT EST AGE 40-64: CPT | Performed by: NURSE PRACTITIONER

## 2025-08-19 ASSESSMENT — ENCOUNTER SYMPTOMS
VOMITING: 0
COUGH: 0
NAUSEA: 0
ABDOMINAL PAIN: 0
DIARRHEA: 0
SHORTNESS OF BREATH: 0